# Patient Record
Sex: FEMALE | Race: WHITE | Employment: UNEMPLOYED | URBAN - METROPOLITAN AREA
[De-identification: names, ages, dates, MRNs, and addresses within clinical notes are randomized per-mention and may not be internally consistent; named-entity substitution may affect disease eponyms.]

---

## 2018-11-24 ENCOUNTER — HOSPITAL ENCOUNTER (INPATIENT)
Age: 33
LOS: 4 days | Discharge: HOME | DRG: 885 | End: 2018-11-28
Attending: EMERGENCY MEDICINE | Admitting: PSYCHIATRY & NEUROLOGY

## 2018-11-24 DIAGNOSIS — F41.9 ANXIETY: Primary | ICD-10-CM

## 2018-11-24 PROBLEM — F33.2 SEVERE EPISODE OF RECURRENT MAJOR DEPRESSIVE DISORDER, WITHOUT PSYCHOTIC FEATURES (HCC): Status: ACTIVE | Noted: 2018-11-24

## 2018-11-24 LAB
ACETAMINOPHEN LEVEL: <5 MCG/ML (ref 10–30)
ALBUMIN SERPL-MCNC: 4.4 G/DL (ref 3.5–5.2)
ALP BLD-CCNC: 76 U/L (ref 35–104)
ALT SERPL-CCNC: 6 U/L (ref 0–32)
AMPHETAMINE SCREEN, URINE: NOT DETECTED
ANION GAP SERPL CALCULATED.3IONS-SCNC: 14 MMOL/L (ref 7–16)
AST SERPL-CCNC: 7 U/L (ref 0–31)
BARBITURATE SCREEN URINE: NOT DETECTED
BENZODIAZEPINE SCREEN, URINE: NOT DETECTED
BILIRUB SERPL-MCNC: 0.2 MG/DL (ref 0–1.2)
BUN BLDV-MCNC: 16 MG/DL (ref 6–20)
CALCIUM SERPL-MCNC: 9.6 MG/DL (ref 8.6–10.2)
CANNABINOID SCREEN URINE: NOT DETECTED
CHLORIDE BLD-SCNC: 103 MMOL/L (ref 98–107)
CHP ED QC CHECK: YES
CO2: 25 MMOL/L (ref 22–29)
COCAINE METABOLITE SCREEN URINE: POSITIVE
CREAT SERPL-MCNC: 1 MG/DL (ref 0.5–1)
ETHANOL: <10 MG/DL (ref 0–0.08)
GFR AFRICAN AMERICAN: >60
GFR NON-AFRICAN AMERICAN: >60 ML/MIN/1.73
GLUCOSE BLD-MCNC: 91 MG/DL (ref 74–99)
HCT VFR BLD CALC: 47.5 % (ref 34–48)
HEMOGLOBIN: 15.3 G/DL (ref 11.5–15.5)
MCH RBC QN AUTO: 27.2 PG (ref 26–35)
MCHC RBC AUTO-ENTMCNC: 32.2 % (ref 32–34.5)
MCV RBC AUTO: 84.5 FL (ref 80–99.9)
METHADONE SCREEN, URINE: NOT DETECTED
OPIATE SCREEN URINE: NOT DETECTED
PDW BLD-RTO: 14.5 FL (ref 11.5–15)
PHENCYCLIDINE SCREEN URINE: NOT DETECTED
PLATELET # BLD: 273 E9/L (ref 130–450)
PMV BLD AUTO: 10.9 FL (ref 7–12)
POTASSIUM SERPL-SCNC: 3.9 MMOL/L (ref 3.5–5)
PREGNANCY TEST URINE, POC: NEGATIVE
PROPOXYPHENE SCREEN: NOT DETECTED
RBC # BLD: 5.62 E12/L (ref 3.5–5.5)
SALICYLATE, SERUM: <0.3 MG/DL (ref 0–30)
SODIUM BLD-SCNC: 142 MMOL/L (ref 132–146)
TOTAL PROTEIN: 7.9 G/DL (ref 6.4–8.3)
TRICYCLIC ANTIDEPRESSANTS SCREEN SERUM: NEGATIVE NG/ML
WBC # BLD: 10.7 E9/L (ref 4.5–11.5)

## 2018-11-24 PROCEDURE — 36415 COLL VENOUS BLD VENIPUNCTURE: CPT

## 2018-11-24 PROCEDURE — 99285 EMERGENCY DEPT VISIT HI MDM: CPT

## 2018-11-24 PROCEDURE — G0480 DRUG TEST DEF 1-7 CLASSES: HCPCS

## 2018-11-24 PROCEDURE — 80061 LIPID PANEL: CPT

## 2018-11-24 PROCEDURE — 80053 COMPREHEN METABOLIC PANEL: CPT

## 2018-11-24 PROCEDURE — 83036 HEMOGLOBIN GLYCOSYLATED A1C: CPT

## 2018-11-24 PROCEDURE — 1240000000 HC EMOTIONAL WELLNESS R&B

## 2018-11-24 PROCEDURE — 6370000000 HC RX 637 (ALT 250 FOR IP): Performed by: NURSE PRACTITIONER

## 2018-11-24 PROCEDURE — 6370000000 HC RX 637 (ALT 250 FOR IP): Performed by: EMERGENCY MEDICINE

## 2018-11-24 PROCEDURE — 85027 COMPLETE CBC AUTOMATED: CPT

## 2018-11-24 PROCEDURE — 80307 DRUG TEST PRSMV CHEM ANLYZR: CPT

## 2018-11-24 PROCEDURE — 99222 1ST HOSP IP/OBS MODERATE 55: CPT | Performed by: NURSE PRACTITIONER

## 2018-11-24 RX ORDER — MAGNESIUM HYDROXIDE/ALUMINUM HYDROXICE/SIMETHICONE 120; 1200; 1200 MG/30ML; MG/30ML; MG/30ML
30 SUSPENSION ORAL PRN
Status: DISCONTINUED | OUTPATIENT
Start: 2018-11-24 | End: 2018-11-28 | Stop reason: HOSPADM

## 2018-11-24 RX ORDER — RISPERIDONE 1 MG/1
1 TABLET, FILM COATED ORAL NIGHTLY
Status: DISCONTINUED | OUTPATIENT
Start: 2018-11-24 | End: 2018-11-28 | Stop reason: HOSPADM

## 2018-11-24 RX ORDER — BENZTROPINE MESYLATE 1 MG/ML
2 INJECTION INTRAMUSCULAR; INTRAVENOUS 2 TIMES DAILY PRN
Status: DISCONTINUED | OUTPATIENT
Start: 2018-11-24 | End: 2018-11-28 | Stop reason: HOSPADM

## 2018-11-24 RX ORDER — LORAZEPAM 1 MG/1
TABLET ORAL
Status: DISPENSED
Start: 2018-11-24 | End: 2018-11-24

## 2018-11-24 RX ORDER — ALPRAZOLAM 1 MG/1
2 TABLET ORAL ONCE
Status: COMPLETED | OUTPATIENT
Start: 2018-11-24 | End: 2018-11-24

## 2018-11-24 RX ORDER — TRAZODONE HYDROCHLORIDE 50 MG/1
50 TABLET ORAL NIGHTLY PRN
Status: DISCONTINUED | OUTPATIENT
Start: 2018-11-24 | End: 2018-11-28 | Stop reason: HOSPADM

## 2018-11-24 RX ORDER — HYDROXYZINE PAMOATE 50 MG/1
50 CAPSULE ORAL EVERY 6 HOURS PRN
Status: DISCONTINUED | OUTPATIENT
Start: 2018-11-24 | End: 2018-11-28 | Stop reason: HOSPADM

## 2018-11-24 RX ORDER — OLANZAPINE 10 MG/1
10 TABLET ORAL
Status: COMPLETED | OUTPATIENT
Start: 2018-11-24 | End: 2018-11-24

## 2018-11-24 RX ORDER — ACETAMINOPHEN 325 MG/1
650 TABLET ORAL EVERY 4 HOURS PRN
Status: DISCONTINUED | OUTPATIENT
Start: 2018-11-24 | End: 2018-11-28 | Stop reason: HOSPADM

## 2018-11-24 RX ORDER — LORAZEPAM 1 MG/1
2 TABLET ORAL ONCE
Status: COMPLETED | OUTPATIENT
Start: 2018-11-24 | End: 2018-11-24

## 2018-11-24 RX ORDER — HALOPERIDOL 5 MG/ML
10 INJECTION INTRAMUSCULAR EVERY 6 HOURS PRN
Status: DISCONTINUED | OUTPATIENT
Start: 2018-11-24 | End: 2018-11-28 | Stop reason: HOSPADM

## 2018-11-24 RX ADMIN — HYDROXYZINE PAMOATE 50 MG: 50 CAPSULE ORAL at 09:28

## 2018-11-24 RX ADMIN — ALPRAZOLAM 2 MG: 1 TABLET ORAL at 05:44

## 2018-11-24 RX ADMIN — OLANZAPINE 10 MG: 10 TABLET, FILM COATED ORAL at 11:07

## 2018-11-24 RX ADMIN — LORAZEPAM 2 MG: 1 TABLET ORAL at 03:01

## 2018-11-24 RX ADMIN — SERTRALINE 25 MG: 50 TABLET, FILM COATED ORAL at 11:07

## 2018-11-24 ASSESSMENT — SLEEP AND FATIGUE QUESTIONNAIRES
DO YOU HAVE DIFFICULTY SLEEPING: NO
AVERAGE NUMBER OF SLEEP HOURS: 3
DO YOU USE A SLEEP AID: NO
AVERAGE NUMBER OF SLEEP HOURS: 3
DO YOU USE A SLEEP AID: NO
DO YOU HAVE DIFFICULTY SLEEPING: NO

## 2018-11-24 ASSESSMENT — LIFESTYLE VARIABLES
HISTORY_ALCOHOL_USE: NO
HISTORY_ALCOHOL_USE: NO

## 2018-11-24 ASSESSMENT — PAIN SCALES - GENERAL: PAINLEVEL_OUTOF10: 0

## 2018-11-24 NOTE — H&P
PSYCHIATRIC EVALUATION  (HISTORY & PHYSICAL)     CHIEF COMPLAINT:   [x] Mood Problems [x] Anxiety Problems [] Psychosis                    [x] Suicidal/Homicidal   [] Aggression  [] Other    HISTORY OF PRESENT ILLNESS: Carol Maldonado  is a 28 y.o. female who has a previous psychiatric history of anxiety  and presents for admission with increased feelings of being overwhelmed, SI without plan, and HI towards no one in particular. Symptoms onset was 2 weeks ago when she states she stopped taking medications and states she has been having anxiety/panic attacks since. Patient is medication seeking and wanting to be injected with ativan, vistaril, and haldol. Patient states she uses heroin and fentanyl, UDS positive for cocaine. Symptoms are constant, severe, and worsened by medication substance abuse.       Precipitating Factors:     [] Family Stress   [] Recent loss/grief Stress   [] Health Stress   [] Relationship Stress    [] Legal Stress   [x] Environmental Stress    [] Occupational Stress   [] Financial Stress   [x] Substance Abuse [] Other      PAST PSYCHIATRIC HISTORY:   History of psychiatric Hospitalization:    [] Denies    [x] yes  [] Days ago     []  Weeks Ago    [x] Months ago  [] Years ago              [] [facility]  [x] [facility]  [] Other:        [x] Once  [] More than once    Outpatient treatment:  [] [facility] Jim[facility]  [] [facility]  [] [facility]              [] [facility]  [] [facility]      [] [address] [place] [address] [x] [facility]      [] [facility] [] [facility]  [] [facility] [] [facility]             [] currently  [x] in the past  [] Non-Compliant    [] Denies    Previous suicide attempt: []Denies            [x] yes  [] OD  [] Cutting  [x] Hanging  [] Gun  [] Other    Previous psych medications:  [x] Was prescribed               [] Currently Taking       [] Never taken medications      PAST MEDICAL HISTORY:       Diagnosis Date    Back pain        FAMILY PSYCHIATRIC HISTORY:  No family history on file.        [] Denies

## 2018-11-24 NOTE — ED NOTES
Attempted to call Dr Nohemy Alfaro to review case for admission, no answer      Yareli Nguyen, RN  11/24/18 1197

## 2018-11-24 NOTE — ED PROVIDER NOTES
after she stopped taking her medication, including xanax, because she could not afford them. She also confirms she is a heroine addict and notes her latest use was 2 days ago. Negative Suicidal ideation. Negative Homicidal ideation. No specific plan. Pt also denies A/V hallucinations, fever, chills, CP, SOB, abd pain, N/V/D. Pt complains of diffuse muscle cramps. Labs along with Labs will be ordered to determine the progression of treatment that will be provided to the patient. Labs and imaging obtained, reviewed; results discussed with patient. Consultations:   Social work     Re-Evaluations:        Counseling: The emergency provider has spoken with thepatient and discussed todays results, in addition to providing specific details for the plan of care and counseling regarding the diagnosis and prognosis. Questions are answered at this time and they are agreeable with the plan.     --------------------------------- IMPRESSION AND DISPOSITION ---------------------------------    IMPRESSION  1. Anxiety          Patient medically clear    DISPOSITION  Disposition: as per consultation   Patient condition is stable    11/24/18, 1:15 AM.    This note is prepared by Aldo Gonzalez, acting as Scribe for Lorri Giordano MD.    Lorri Giordano MD:  The scribe's documentation has been prepared under my direction and personally reviewed by me in its entirety. I confirm that the note above accurately reflects all work, treatment, procedures, and medical decision making performed by me.       Lorri Giordano MD  11/24/18 Ul. Joshua Verdin MD  11/24/18 0265

## 2018-11-25 LAB
CHOLESTEROL, TOTAL: 166 MG/DL (ref 0–199)
HBA1C MFR BLD: 5.4 % (ref 4–5.6)
HDLC SERPL-MCNC: 47 MG/DL
LDL CHOLESTEROL CALCULATED: 92 MG/DL (ref 0–99)
TRIGL SERPL-MCNC: 135 MG/DL (ref 0–149)
VLDLC SERPL CALC-MCNC: 27 MG/DL

## 2018-11-25 PROCEDURE — 99231 SBSQ HOSP IP/OBS SF/LOW 25: CPT | Performed by: NURSE PRACTITIONER

## 2018-11-25 PROCEDURE — 6370000000 HC RX 637 (ALT 250 FOR IP): Performed by: NURSE PRACTITIONER

## 2018-11-25 PROCEDURE — 1240000000 HC EMOTIONAL WELLNESS R&B

## 2018-11-25 RX ADMIN — HYDROXYZINE PAMOATE 50 MG: 50 CAPSULE ORAL at 08:58

## 2018-11-25 RX ADMIN — HYDROXYZINE PAMOATE 50 MG: 50 CAPSULE ORAL at 20:06

## 2018-11-25 RX ADMIN — HYDROXYZINE PAMOATE 50 MG: 50 CAPSULE ORAL at 15:10

## 2018-11-25 RX ADMIN — NICOTINE POLACRILEX 2 MG: 2 GUM, CHEWING BUCCAL at 20:36

## 2018-11-25 RX ADMIN — TRAZODONE HYDROCHLORIDE 50 MG: 50 TABLET ORAL at 20:06

## 2018-11-25 RX ADMIN — NICOTINE POLACRILEX 2 MG: 2 GUM, CHEWING BUCCAL at 15:53

## 2018-11-25 RX ADMIN — ACETAMINOPHEN 650 MG: 325 TABLET, FILM COATED ORAL at 16:48

## 2018-11-25 RX ADMIN — SERTRALINE 25 MG: 50 TABLET, FILM COATED ORAL at 08:39

## 2018-11-25 RX ADMIN — ACETAMINOPHEN 650 MG: 325 TABLET, FILM COATED ORAL at 08:58

## 2018-11-25 RX ADMIN — RISPERIDONE 1 MG: 1 TABLET, FILM COATED ORAL at 20:06

## 2018-11-25 ASSESSMENT — PAIN SCALES - GENERAL
PAINLEVEL_OUTOF10: 5
PAINLEVEL_OUTOF10: 0
PAINLEVEL_OUTOF10: 3
PAINLEVEL_OUTOF10: 0

## 2018-11-25 ASSESSMENT — PAIN DESCRIPTION - ONSET: ONSET: ON-GOING

## 2018-11-25 ASSESSMENT — PAIN DESCRIPTION - FREQUENCY: FREQUENCY: INTERMITTENT

## 2018-11-25 ASSESSMENT — PAIN DESCRIPTION - LOCATION: LOCATION: HEAD

## 2018-11-25 ASSESSMENT — PAIN DESCRIPTION - PROGRESSION: CLINICAL_PROGRESSION: NOT CHANGED

## 2018-11-25 ASSESSMENT — PAIN DESCRIPTION - PAIN TYPE: TYPE: ACUTE PAIN

## 2018-11-25 ASSESSMENT — PAIN DESCRIPTION - DESCRIPTORS: DESCRIPTORS: ACHING;HEADACHE

## 2018-11-26 PROCEDURE — 1240000000 HC EMOTIONAL WELLNESS R&B

## 2018-11-26 PROCEDURE — 6370000000 HC RX 637 (ALT 250 FOR IP): Performed by: NURSE PRACTITIONER

## 2018-11-26 RX ADMIN — SERTRALINE 50 MG: 50 TABLET, FILM COATED ORAL at 08:55

## 2018-11-26 RX ADMIN — HYDROXYZINE PAMOATE 50 MG: 50 CAPSULE ORAL at 12:32

## 2018-11-26 RX ADMIN — RISPERIDONE 1 MG: 1 TABLET, FILM COATED ORAL at 20:18

## 2018-11-26 RX ADMIN — TRAZODONE HYDROCHLORIDE 50 MG: 50 TABLET ORAL at 20:18

## 2018-11-26 RX ADMIN — HYDROXYZINE PAMOATE 50 MG: 50 CAPSULE ORAL at 18:38

## 2018-11-26 RX ADMIN — NICOTINE POLACRILEX 2 MG: 2 GUM, CHEWING BUCCAL at 10:58

## 2018-11-26 ASSESSMENT — PAIN SCALES - GENERAL
PAINLEVEL_OUTOF10: 0
PAINLEVEL_OUTOF10: 0

## 2018-11-26 NOTE — PLAN OF CARE
Problem: Altered Mood, Depressive Behavior:  Goal: Able to verbalize and/or display a decrease in depressive symptoms  Able to verbalize and/or display a decrease in depressive symptoms   Outcome: Ongoing    Goal: Absence of self-harm  Absence of self-harm   Outcome: Ongoing  Pt is stable and without distress. Pt is cooperative. Pt denies suicidal / homicidal ideations. Pt denies hallucinations. Will follow and monitor.

## 2018-11-27 PROCEDURE — 1240000000 HC EMOTIONAL WELLNESS R&B

## 2018-11-27 PROCEDURE — 6360000002 HC RX W HCPCS: Performed by: NURSE PRACTITIONER

## 2018-11-27 PROCEDURE — 6370000000 HC RX 637 (ALT 250 FOR IP): Performed by: NURSE PRACTITIONER

## 2018-11-27 RX ADMIN — HYDROXYZINE PAMOATE 50 MG: 50 CAPSULE ORAL at 08:54

## 2018-11-27 RX ADMIN — RISPERIDONE 1 MG: 1 TABLET, FILM COATED ORAL at 20:28

## 2018-11-27 RX ADMIN — SERTRALINE 50 MG: 50 TABLET, FILM COATED ORAL at 08:54

## 2018-11-27 RX ADMIN — HYDROXYZINE PAMOATE 50 MG: 50 CAPSULE ORAL at 20:28

## 2018-11-27 RX ADMIN — HALOPERIDOL LACTATE 10 MG: 5 INJECTION, SOLUTION INTRAMUSCULAR at 13:32

## 2018-11-27 RX ADMIN — TRAZODONE HYDROCHLORIDE 50 MG: 50 TABLET ORAL at 20:28

## 2018-11-27 RX ADMIN — BENZTROPINE MESYLATE 2 MG: 1 INJECTION, SOLUTION INTRAMUSCULAR; INTRAVENOUS at 13:32

## 2018-11-27 ASSESSMENT — PAIN SCALES - GENERAL
PAINLEVEL_OUTOF10: 0
PAINLEVEL_OUTOF10: 0

## 2018-11-28 VITALS
OXYGEN SATURATION: 98 % | SYSTOLIC BLOOD PRESSURE: 101 MMHG | RESPIRATION RATE: 16 BRPM | WEIGHT: 170 LBS | HEIGHT: 69 IN | HEART RATE: 76 BPM | TEMPERATURE: 98 F | BODY MASS INDEX: 25.18 KG/M2 | DIASTOLIC BLOOD PRESSURE: 64 MMHG

## 2018-11-28 LAB — COCAINE, CONFIRM, URINE: >1000 NG/ML

## 2018-11-28 PROCEDURE — 6370000000 HC RX 637 (ALT 250 FOR IP): Performed by: NURSE PRACTITIONER

## 2018-11-28 PROCEDURE — 99238 HOSP IP/OBS DSCHRG MGMT 30/<: CPT | Performed by: PSYCHIATRY & NEUROLOGY

## 2018-11-28 RX ORDER — RISPERIDONE 1 MG/1
1 TABLET, FILM COATED ORAL NIGHTLY
Qty: 60 TABLET | Refills: 0 | Status: SHIPPED | OUTPATIENT
Start: 2018-11-28 | End: 2021-08-11

## 2018-11-28 RX ADMIN — SERTRALINE 50 MG: 50 TABLET, FILM COATED ORAL at 08:47

## 2018-11-28 RX ADMIN — HYDROXYZINE PAMOATE 50 MG: 50 CAPSULE ORAL at 08:47

## 2018-11-28 NOTE — PROGRESS NOTES
CLINICAL PHARMACY NOTE: MEDS TO [address] [place] [address] Drive Select Patient?: No  Total # of Prescriptions Filled: 3   The following medications were delivered to the patient:  · Nicotine 2 Gum  · Sertraline 50  · Risperdone 1  Total # of Interventions Completed: 3  Time Spent (min): 30    Additional Documentation:
Demanding medication, stating \"Im going to fucking flip out in this place\". Another pt reported to Alexandr Scott RN that this pt was asking her how to get ativan prescribed. Staff is aware of pt's med seeking behaviors.
Making threats to \" fuck somebody up\". Encouraged this patient to express her feelings. She stated \" They were giving me xanax and ativan, and then I come here and im not getting anything\". Explained to pt that those medications are not ordered at this time. This nurse explained the medications that are ordered for her to help with her anxiety, and she stated \"none of that shit helps\". Pt threatened to \"flip out\", and this nurse explained that we have a quiet room for time out if she were to need it. Pt agreed to try zyprexa 10mg PRN once, and was compliant with zoloft. Will continue to monitor and offer support.
Patient attended community meeting. Shared daily goal of \" Stay positive\". Group Therapy Note    Date: 11/26/2018  Start Time: 10:00 am  End Time:  10:50 am  Number of Participants: 20    Type of Group: Psychoeducation    Wellness Binder Information  Module Name:  Looking Back Looking Forward  Session Number:  N/A    Patient's Goal:  Patient will reflect accomplishments and challenges that impact wellness recovery. Notes:  Positively participated in discussion. Able to share with peers examples looking back and forward. Status After Intervention:  Improved    Participation Level:  Active Listener and Interactive    Participation Quality: Appropriate, Attentive and Sharing      Speech:  normal      Thought Process/Content: Logical      Affective Functioning: Congruent      Mood: euthymic      Level of consciousness:  Alert and Attentive      Response to Learning: Progressing to goal      Endings: None Reported    Modes of Intervention: Education, Support, Socialization and Exploration      Discipline Responsible: Psychoeducational Specialist      Signature:  Chase Joseph, [address]
Patient attended community meeting. Shared daily goal of \" Stay positive\". Group Therapy Note    Date: 11/27/2018  Start Time: 10:00 am  End Time:  10:45 am  Number of Participants: 20     Type of Group: Psychoeducation     Wellness Binder Information  Module Name:  Goals & Change  Session Number:  N/A     Patient's Goal:  Patient will recognize importance of goals and change in wellness recovery.     Notes:  Positively participated in discussion. Able to identify meaningful goals to promote change. Status After Intervention:  Improved    Participation Level:  Active Listener and Interactive    Participation Quality: Appropriate and Attentive      Speech:  normal      Thought Process/Content: Logical      Affective Functioning: Congruent      Mood: euthymic      Level of consciousness:  Alert and Attentive      Response to Learning: Progressing to goal      Endings: None Reported    Modes of Intervention: Education, Support, Socialization and Exploration      Discipline Responsible: Psychoeducational Specialist      Signature:  Lindsay Decker, [address]
Pt. Angry irritable referring to me as \"fucking bitch\", showing off for other pt.s, haldol requested and given per order.
Judgment: Poor Judgment, Poor Insight  Present Suicidal Ideation: No  Present Homicidal Ideation: No    Daily Assessment Last Entry:   Daily Sleep (WDL): Within Defined Limits         Patient Currently in Pain: Denies  Daily Nutrition (WDL): Within Defined Limits    Patient Monitoring:  Frequency of Checks: 4 times per hour, close    Psychiatric Symptoms:   Depression Symptoms  Depression Symptoms: No problems reported or observed. Anxiety Symptoms  Anxiety Symptoms: No problems reported or observed., Generalized  Janiya Symptoms  Janiya Symptoms: No problems reported or observed. Psychosis Symptoms  Hallucination Type: No problems reported or observed. Delusion Type: No problems reported or observed. Suicide Risk CSSR-S:  1) Within the past month, have you wished you were dead or wished you could go to sleep and not wake up? : NO  2) Within the past month, have you actually had any thoughts of killing yourself? : YES  3) Within the past month, have you been thinking about how you might kill yourself? : NO  5) Within the past month, have you started to work out or worked out the details of how to kill yourself?  Do you intend to carry out this plan? : NO  6)  Have you ever done anything, started to do anything, or prepared to do anything to end your life?: NO  Change in Result no Change in Plan of care none      EDUCATION:   Learner Progress Toward Treatment Goals: progressing    Method: follow care plan    Outcome: meet goals AND RETURN HOME    PATIENT GOALS: \"STAY POSITIVE\"    PLAN/TREATMENT RECOMMENDATIONS UPDATE: CONTINUE PRESENT CARE PLAN    GOALS UPDATE:   Time frame for Short-Term Goals: 3-5 days      Patrice Esparza RN

## 2018-11-28 NOTE — PLAN OF CARE
Problem: Altered Mood, Depressive Behavior:  Goal: Able to verbalize acceptance of life and situations over which he or she has no control  Able to verbalize acceptance of life and situations over which he or she has no control   Outcome: Ongoing    Goal: Able to verbalize support systems  Able to verbalize support systems   Outcome: Ongoing  Pt is stable. Pt denies suicidal or homicidal ideations. Pt denies hallucinations. Pt cooperative. Reports goal of having a positive outlook. Will follow and monitor.

## 2019-10-31 ENCOUNTER — HOSPITAL ENCOUNTER (EMERGENCY)
Age: 34
Discharge: HOME | End: 2019-10-31

## 2019-10-31 VITALS
DIASTOLIC BLOOD PRESSURE: 80 MMHG | OXYGEN SATURATION: 98 % | HEART RATE: 75 BPM | BODY MASS INDEX: 23.86 KG/M2 | TEMPERATURE: 98 F | HEIGHT: 69 IN | SYSTOLIC BLOOD PRESSURE: 127 MMHG | WEIGHT: 161.06 LBS | RESPIRATION RATE: 17 BRPM

## 2019-10-31 DIAGNOSIS — Z76.0 ENCOUNTER FOR MEDICATION REFILL: Primary | ICD-10-CM

## 2019-10-31 DIAGNOSIS — K08.89 PAIN, DENTAL: ICD-10-CM

## 2019-10-31 PROCEDURE — 99282 EMERGENCY DEPT VISIT SF MDM: CPT

## 2019-10-31 PROCEDURE — 6370000000 HC RX 637 (ALT 250 FOR IP): Performed by: NURSE PRACTITIONER

## 2019-10-31 PROCEDURE — 6360000002 HC RX W HCPCS: Performed by: NURSE PRACTITIONER

## 2019-10-31 PROCEDURE — 96372 THER/PROPH/DIAG INJ SC/IM: CPT

## 2019-10-31 RX ORDER — CHLORHEXIDINE GLUCONATE 0.12 MG/ML
15 RINSE ORAL 2 TIMES DAILY
Qty: 420 ML | Refills: 0 | Status: SHIPPED | OUTPATIENT
Start: 2019-10-31 | End: 2019-11-14

## 2019-10-31 RX ORDER — AMOXICILLIN 500 MG/1
500 CAPSULE ORAL 2 TIMES DAILY
Qty: 14 CAPSULE | Refills: 0 | Status: SHIPPED | OUTPATIENT
Start: 2019-10-31 | End: 2019-11-07

## 2019-10-31 RX ORDER — AMOXICILLIN 250 MG/1
500 CAPSULE ORAL ONCE
Status: COMPLETED | OUTPATIENT
Start: 2019-10-31 | End: 2019-10-31

## 2019-10-31 RX ORDER — IBUPROFEN 800 MG/1
800 TABLET ORAL EVERY 8 HOURS PRN
Qty: 21 TABLET | Refills: 0 | Status: SHIPPED | OUTPATIENT
Start: 2019-10-31 | End: 2021-08-31 | Stop reason: ALTCHOICE

## 2019-10-31 RX ORDER — HYDROXYZINE HYDROCHLORIDE 50 MG/ML
50 INJECTION, SOLUTION INTRAMUSCULAR ONCE
Status: COMPLETED | OUTPATIENT
Start: 2019-10-31 | End: 2019-10-31

## 2019-10-31 RX ORDER — IBUPROFEN 800 MG/1
800 TABLET ORAL ONCE
Status: COMPLETED | OUTPATIENT
Start: 2019-10-31 | End: 2019-10-31

## 2019-10-31 RX ADMIN — HYDROXYZINE HYDROCHLORIDE 50 MG: 50 INJECTION, SOLUTION INTRAMUSCULAR at 01:14

## 2019-10-31 RX ADMIN — IBUPROFEN 800 MG: 800 TABLET, FILM COATED ORAL at 01:13

## 2019-10-31 RX ADMIN — AMOXICILLIN 500 MG: 250 CAPSULE ORAL at 01:14

## 2019-10-31 ASSESSMENT — PAIN SCALES - GENERAL: PAINLEVEL_OUTOF10: 7

## 2019-11-02 ENCOUNTER — HOSPITAL ENCOUNTER (EMERGENCY)
Age: 34
Discharge: LEFT BEFORE DISCHARGE | End: 2019-11-02
Attending: EMERGENCY MEDICINE

## 2019-11-02 VITALS
WEIGHT: 161 LBS | HEIGHT: 68 IN | DIASTOLIC BLOOD PRESSURE: 87 MMHG | SYSTOLIC BLOOD PRESSURE: 102 MMHG | HEART RATE: 75 BPM | TEMPERATURE: 97.3 F | OXYGEN SATURATION: 98 % | BODY MASS INDEX: 24.4 KG/M2 | RESPIRATION RATE: 16 BRPM

## 2019-11-02 DIAGNOSIS — L03.90 CELLULITIS, UNSPECIFIED CELLULITIS SITE: ICD-10-CM

## 2019-11-02 DIAGNOSIS — Z53.21 ELOPED FROM EMERGENCY DEPARTMENT: Primary | ICD-10-CM

## 2019-11-02 PROCEDURE — 2500000003 HC RX 250 WO HCPCS: Performed by: NURSE PRACTITIONER

## 2019-11-02 PROCEDURE — 6370000000 HC RX 637 (ALT 250 FOR IP): Performed by: NURSE PRACTITIONER

## 2019-11-02 PROCEDURE — 6370000000 HC RX 637 (ALT 250 FOR IP): Performed by: EMERGENCY MEDICINE

## 2019-11-02 PROCEDURE — 99282 EMERGENCY DEPT VISIT SF MDM: CPT

## 2019-11-02 RX ORDER — LIDOCAINE HYDROCHLORIDE 10 MG/ML
5 INJECTION, SOLUTION INFILTRATION; PERINEURAL ONCE
Status: COMPLETED | OUTPATIENT
Start: 2019-11-02 | End: 2019-11-02

## 2019-11-02 RX ORDER — IBUPROFEN 800 MG/1
800 TABLET ORAL ONCE
Status: COMPLETED | OUTPATIENT
Start: 2019-11-02 | End: 2019-11-02

## 2019-11-02 RX ORDER — DOXYCYCLINE HYCLATE 100 MG/1
100 CAPSULE ORAL ONCE
Status: COMPLETED | OUTPATIENT
Start: 2019-11-02 | End: 2019-11-02

## 2019-11-02 RX ADMIN — IBUPROFEN 800 MG: 800 TABLET, FILM COATED ORAL at 02:57

## 2019-11-02 RX ADMIN — DOXYCYCLINE HYCLATE 100 MG: 100 CAPSULE ORAL at 02:58

## 2019-11-02 RX ADMIN — LIDOCAINE HYDROCHLORIDE 5 ML: 10 INJECTION, SOLUTION INFILTRATION; PERINEURAL at 02:58

## 2019-11-02 ASSESSMENT — PAIN DESCRIPTION - PAIN TYPE: TYPE: ACUTE PAIN

## 2019-11-02 ASSESSMENT — PAIN DESCRIPTION - FREQUENCY: FREQUENCY: CONTINUOUS

## 2019-11-02 ASSESSMENT — PAIN SCALES - GENERAL
PAINLEVEL_OUTOF10: 5
PAINLEVEL_OUTOF10: 10

## 2019-11-02 ASSESSMENT — PAIN DESCRIPTION - ORIENTATION: ORIENTATION: RIGHT;LEFT

## 2019-11-02 ASSESSMENT — PAIN DESCRIPTION - DESCRIPTORS: DESCRIPTORS: BURNING;STABBING

## 2019-11-02 ASSESSMENT — PAIN DESCRIPTION - LOCATION: LOCATION: HAND;WRIST

## 2020-02-06 ENCOUNTER — HOSPITAL ENCOUNTER (EMERGENCY)
Age: 35
Discharge: LEFT BEFORE DISCHARGE | End: 2020-02-06
Attending: EMERGENCY MEDICINE
Payer: MEDICAID

## 2020-02-06 VITALS
OXYGEN SATURATION: 97 % | WEIGHT: 162 LBS | TEMPERATURE: 98.5 F | DIASTOLIC BLOOD PRESSURE: 99 MMHG | SYSTOLIC BLOOD PRESSURE: 146 MMHG | RESPIRATION RATE: 18 BRPM | HEART RATE: 85 BPM | BODY MASS INDEX: 24.55 KG/M2 | HEIGHT: 68 IN

## 2020-02-06 LAB
BACTERIA: ABNORMAL /HPF
BILIRUBIN URINE: NEGATIVE
BLOOD, URINE: NEGATIVE
CLARITY: CLEAR
COLOR: YELLOW
EPITHELIAL CELLS, UA: ABNORMAL /HPF
GLUCOSE URINE: NEGATIVE MG/DL
HCG, URINE, POC: NEGATIVE
KETONES, URINE: NEGATIVE MG/DL
LEUKOCYTE ESTERASE, URINE: NEGATIVE
Lab: NORMAL
NEGATIVE QC PASS/FAIL: NORMAL
NITRITE, URINE: POSITIVE
PH UA: 7 (ref 5–9)
POSITIVE QC PASS/FAIL: NORMAL
PROTEIN UA: NEGATIVE MG/DL
RBC UA: ABNORMAL /HPF (ref 0–2)
SPECIFIC GRAVITY UA: 1.02 (ref 1–1.03)
UROBILINOGEN, URINE: 0.2 E.U./DL
WBC UA: ABNORMAL /HPF (ref 0–5)

## 2020-02-06 PROCEDURE — 99283 EMERGENCY DEPT VISIT LOW MDM: CPT

## 2020-02-06 PROCEDURE — 6370000000 HC RX 637 (ALT 250 FOR IP): Performed by: EMERGENCY MEDICINE

## 2020-02-06 PROCEDURE — 81001 URINALYSIS AUTO W/SCOPE: CPT

## 2020-02-06 RX ORDER — CLONIDINE HYDROCHLORIDE 0.1 MG/1
0.1 TABLET ORAL ONCE
Status: COMPLETED | OUTPATIENT
Start: 2020-02-06 | End: 2020-02-06

## 2020-02-06 RX ORDER — IBUPROFEN 600 MG/1
600 TABLET ORAL ONCE
Status: COMPLETED | OUTPATIENT
Start: 2020-02-06 | End: 2020-02-06

## 2020-02-06 RX ORDER — CEFDINIR 300 MG/1
300 CAPSULE ORAL 2 TIMES DAILY
Qty: 20 CAPSULE | Refills: 0 | Status: SHIPPED | OUTPATIENT
Start: 2020-02-06 | End: 2020-02-16

## 2020-02-06 RX ADMIN — CLONIDINE HYDROCHLORIDE 0.1 MG: 0.1 TABLET ORAL at 21:14

## 2020-02-06 RX ADMIN — IBUPROFEN 600 MG: 600 TABLET, FILM COATED ORAL at 21:42

## 2020-02-06 ASSESSMENT — ENCOUNTER SYMPTOMS
DIARRHEA: 0
NAUSEA: 0
RHINORRHEA: 1
BACK PAIN: 0
EYE DISCHARGE: 0
SHORTNESS OF BREATH: 0
COUGH: 0
SINUS PRESSURE: 0
EYE PAIN: 0
ABDOMINAL PAIN: 1
WHEEZING: 0
ABDOMINAL DISTENTION: 0
VOMITING: 0
SORE THROAT: 0
EYE REDNESS: 0

## 2020-02-06 ASSESSMENT — PAIN DESCRIPTION - PAIN TYPE: TYPE: CHRONIC PAIN

## 2020-02-06 ASSESSMENT — PAIN DESCRIPTION - ORIENTATION: ORIENTATION: LEFT

## 2020-02-06 ASSESSMENT — PAIN DESCRIPTION - ONSET: ONSET: ON-GOING

## 2020-02-06 ASSESSMENT — PAIN DESCRIPTION - DESCRIPTORS: DESCRIPTORS: BURNING

## 2020-02-06 ASSESSMENT — PAIN SCALES - GENERAL
PAINLEVEL_OUTOF10: 10
PAINLEVEL_OUTOF10: 10

## 2020-02-06 ASSESSMENT — PAIN DESCRIPTION - LOCATION: LOCATION: HAND

## 2020-02-06 ASSESSMENT — PAIN DESCRIPTION - PROGRESSION: CLINICAL_PROGRESSION: NOT CHANGED

## 2020-02-06 ASSESSMENT — PAIN DESCRIPTION - FREQUENCY: FREQUENCY: CONTINUOUS

## 2020-02-07 NOTE — ED PROVIDER NOTES
Patient is a 25-year-old female past medical history of anxiety, depression, IV drug abuse presenting to the emergency department today for dope sickness. Patient was arrested this evening by police and stated that she \"did not want to die in residential\". Her last use of heroin was this morning. She does use intravenously. She states she has been feeling the withdrawal symptoms for the last couple hours. She is having congestion, runny nose, abdominal cramping. She also is complaining of urinary complaints. She states dysuria and increased frequency over the last 3 to 4 days. She states she is also having some mild right flank pain yesterday but that has improved. Denies any nausea, vomiting, chest pain, shortness of breath, fevers, chills. She also is complaining of abscesses that she states she has had for a \"very long time\". Has been able to tolerate food and liquids p.o. today without complaint. Has not tried anything at home for the pain or sickness. Nothing makes it better nothing makes it worse. Pain does not radiate. Denies urinary bowel incontinence, no saddle paresthesias. Review of Systems   Constitutional: Negative for chills and fever. HENT: Positive for congestion and rhinorrhea. Negative for ear pain, sinus pressure and sore throat. Eyes: Negative for pain, discharge and redness. Respiratory: Negative for cough, shortness of breath and wheezing. Cardiovascular: Negative for chest pain and leg swelling. Gastrointestinal: Positive for abdominal pain (cramping). Negative for abdominal distention, diarrhea, nausea and vomiting. Genitourinary: Positive for dysuria, flank pain (yesterday, improving) and frequency. Negative for decreased urine volume and hematuria. Musculoskeletal: Negative for arthralgias, back pain, gait problem, neck pain and neck stiffness. Skin: Negative for pallor and rash. Neurological: Negative for dizziness, seizures, weakness and headaches.

## 2021-02-06 ENCOUNTER — HOSPITAL ENCOUNTER (EMERGENCY)
Age: 36
Discharge: LEFT BEFORE DISCHARGE | End: 2021-02-07
Attending: EMERGENCY MEDICINE
Payer: MEDICARE

## 2021-02-06 DIAGNOSIS — T40.601A OPIATE OVERDOSE, ACCIDENTAL OR UNINTENTIONAL, INITIAL ENCOUNTER (HCC): Primary | ICD-10-CM

## 2021-02-06 DIAGNOSIS — R41.82 ALTERED MENTAL STATUS, UNSPECIFIED ALTERED MENTAL STATUS TYPE: ICD-10-CM

## 2021-02-06 LAB
CHP ED QC CHECK: YES
GLUCOSE BLD-MCNC: 72 MG/DL
METER GLUCOSE: 102 MG/DL (ref 74–99)
METER GLUCOSE: 72 MG/DL (ref 74–99)

## 2021-02-06 PROCEDURE — 96374 THER/PROPH/DIAG INJ IV PUSH: CPT

## 2021-02-06 PROCEDURE — 6360000002 HC RX W HCPCS: Performed by: STUDENT IN AN ORGANIZED HEALTH CARE EDUCATION/TRAINING PROGRAM

## 2021-02-06 PROCEDURE — 99285 EMERGENCY DEPT VISIT HI MDM: CPT

## 2021-02-06 PROCEDURE — 82962 GLUCOSE BLOOD TEST: CPT

## 2021-02-06 RX ORDER — NALOXONE HYDROCHLORIDE 1 MG/ML
1 INJECTION INTRAMUSCULAR; INTRAVENOUS; SUBCUTANEOUS ONCE
Status: COMPLETED | OUTPATIENT
Start: 2021-02-06 | End: 2021-02-06

## 2021-02-06 RX ADMIN — NALOXONE HYDROCHLORIDE 1 MG: 1 INJECTION PARENTERAL at 23:32

## 2021-02-07 ENCOUNTER — APPOINTMENT (OUTPATIENT)
Dept: GENERAL RADIOLOGY | Age: 36
End: 2021-02-07
Payer: MEDICARE

## 2021-02-07 VITALS
DIASTOLIC BLOOD PRESSURE: 94 MMHG | TEMPERATURE: 97.7 F | OXYGEN SATURATION: 93 % | SYSTOLIC BLOOD PRESSURE: 120 MMHG | RESPIRATION RATE: 10 BRPM | HEART RATE: 97 BPM

## 2021-02-07 LAB
ACETAMINOPHEN LEVEL: <5 MCG/ML (ref 10–30)
ALBUMIN SERPL-MCNC: 4 G/DL (ref 3.5–5.2)
ALP BLD-CCNC: 84 U/L (ref 35–104)
ALT SERPL-CCNC: 66 U/L (ref 0–32)
ANION GAP SERPL CALCULATED.3IONS-SCNC: 9 MMOL/L (ref 7–16)
AST SERPL-CCNC: 44 U/L (ref 0–31)
B.E.: 1.5 MMOL/L (ref -3–0)
BASOPHILS ABSOLUTE: 0.04 E9/L (ref 0–0.2)
BASOPHILS RELATIVE PERCENT: 0.4 % (ref 0–2)
BILIRUB SERPL-MCNC: 0.4 MG/DL (ref 0–1.2)
BUN BLDV-MCNC: 10 MG/DL (ref 6–20)
CALCIUM SERPL-MCNC: 8.7 MG/DL (ref 8.6–10.2)
CARDIOPULMONARY BYPASS: NO
CHLORIDE BLD-SCNC: 103 MMOL/L (ref 98–107)
CO2: 26 MMOL/L (ref 22–29)
CREAT SERPL-MCNC: 0.8 MG/DL (ref 0.5–1)
DELIVERY SYSTEMS: ABNORMAL
DEVICE: ABNORMAL
EKG ATRIAL RATE: 95 BPM
EKG P AXIS: 68 DEGREES
EKG P-R INTERVAL: 120 MS
EKG Q-T INTERVAL: 352 MS
EKG QRS DURATION: 82 MS
EKG QTC CALCULATION (BAZETT): 442 MS
EKG R AXIS: 36 DEGREES
EKG T AXIS: 25 DEGREES
EKG VENTRICULAR RATE: 95 BPM
EOSINOPHILS ABSOLUTE: 0.14 E9/L (ref 0.05–0.5)
EOSINOPHILS RELATIVE PERCENT: 1.5 % (ref 0–6)
ETHANOL: <10 MG/DL (ref 0–0.08)
GFR AFRICAN AMERICAN: >60
GFR NON-AFRICAN AMERICAN: >60 ML/MIN/1.73
GLUCOSE BLD-MCNC: 99 MG/DL (ref 74–99)
HCO3 ARTERIAL: 28.7 MMOL/L (ref 22–26)
HCT (EST): 43 % (ref 34–48)
HCT VFR BLD CALC: 43.3 % (ref 34–48)
HEMOGLOBIN: 13.6 G/DL (ref 11.5–15.5)
HGB, (EST): 14.7 G/DL (ref 11.5–15.5)
IMMATURE GRANULOCYTES #: 0.06 E9/L
IMMATURE GRANULOCYTES %: 0.7 % (ref 0–5)
LYMPHOCYTES ABSOLUTE: 2.43 E9/L (ref 1.5–4)
LYMPHOCYTES RELATIVE PERCENT: 26.7 % (ref 20–42)
MCH RBC QN AUTO: 27.6 PG (ref 26–35)
MCHC RBC AUTO-ENTMCNC: 31.4 % (ref 32–34.5)
MCV RBC AUTO: 88 FL (ref 80–99.9)
MONOCYTES ABSOLUTE: 0.88 E9/L (ref 0.1–0.95)
MONOCYTES RELATIVE PERCENT: 9.7 % (ref 2–12)
NEUTROPHILS ABSOLUTE: 5.55 E9/L (ref 1.8–7.3)
NEUTROPHILS RELATIVE PERCENT: 61 % (ref 43–80)
O2 SATURATION: 95.7 % (ref 92–98.5)
OPERATOR ID: 1064
PCO2 ARTERIAL: 54.2 MMHG (ref 35–45)
PDW BLD-RTO: 14.7 FL (ref 11.5–15)
PH BLOOD GAS: 7.33 (ref 7.35–7.45)
PLATELET # BLD: 233 E9/L (ref 130–450)
PMV BLD AUTO: 10.9 FL (ref 7–12)
PO2 ARTERIAL: 87 MMHG (ref 80–100)
POTASSIUM SERPL-SCNC: 4.1 MMOL/L (ref 3.5–5.5)
POTASSIUM SERPL-SCNC: 4.2 MMOL/L (ref 3.5–5)
PRO-BNP: 145 PG/ML (ref 0–125)
RBC # BLD: 4.92 E12/L (ref 3.5–5.5)
SALICYLATE, SERUM: <0.3 MG/DL (ref 0–30)
SARS-COV-2, NAAT: NOT DETECTED
SODIUM BLD-SCNC: 138 MMOL/L (ref 132–146)
SOURCE, BLOOD GAS: ABNORMAL
TOTAL CK: 54 U/L (ref 20–180)
TOTAL PROTEIN: 7.3 G/DL (ref 6.4–8.3)
TRICYCLIC ANTIDEPRESSANTS SCREEN SERUM: NEGATIVE NG/ML
WBC # BLD: 9.1 E9/L (ref 4.5–11.5)

## 2021-02-07 PROCEDURE — 80143 DRUG ASSAY ACETAMINOPHEN: CPT

## 2021-02-07 PROCEDURE — 82550 ASSAY OF CK (CPK): CPT

## 2021-02-07 PROCEDURE — 82803 BLOOD GASES ANY COMBINATION: CPT

## 2021-02-07 PROCEDURE — 80179 DRUG ASSAY SALICYLATE: CPT

## 2021-02-07 PROCEDURE — 6360000002 HC RX W HCPCS: Performed by: EMERGENCY MEDICINE

## 2021-02-07 PROCEDURE — U0002 COVID-19 LAB TEST NON-CDC: HCPCS

## 2021-02-07 PROCEDURE — 93010 ELECTROCARDIOGRAM REPORT: CPT | Performed by: INTERNAL MEDICINE

## 2021-02-07 PROCEDURE — 83880 ASSAY OF NATRIURETIC PEPTIDE: CPT

## 2021-02-07 PROCEDURE — 82077 ASSAY SPEC XCP UR&BREATH IA: CPT

## 2021-02-07 PROCEDURE — 85025 COMPLETE CBC W/AUTO DIFF WBC: CPT

## 2021-02-07 PROCEDURE — 71045 X-RAY EXAM CHEST 1 VIEW: CPT

## 2021-02-07 PROCEDURE — 93005 ELECTROCARDIOGRAM TRACING: CPT | Performed by: EMERGENCY MEDICINE

## 2021-02-07 PROCEDURE — 80053 COMPREHEN METABOLIC PANEL: CPT

## 2021-02-07 PROCEDURE — 80307 DRUG TEST PRSMV CHEM ANLYZR: CPT

## 2021-02-07 RX ORDER — MECLIZINE HCL 12.5 MG/1
12.5 TABLET ORAL ONCE
Status: DISCONTINUED | OUTPATIENT
Start: 2021-02-07 | End: 2021-02-07

## 2021-02-07 RX ORDER — NALOXONE HYDROCHLORIDE 0.4 MG/ML
2 INJECTION, SOLUTION INTRAMUSCULAR; INTRAVENOUS; SUBCUTANEOUS ONCE
Status: DISCONTINUED | OUTPATIENT
Start: 2021-02-07 | End: 2021-02-07

## 2021-02-07 RX ORDER — NALOXONE HYDROCHLORIDE 1 MG/ML
1 INJECTION INTRAMUSCULAR; INTRAVENOUS; SUBCUTANEOUS ONCE
Status: DISCONTINUED | OUTPATIENT
Start: 2021-02-07 | End: 2021-02-07

## 2021-02-07 RX ORDER — NALOXONE HYDROCHLORIDE 1 MG/ML
4 INJECTION INTRAMUSCULAR; INTRAVENOUS; SUBCUTANEOUS ONCE
Status: COMPLETED | OUTPATIENT
Start: 2021-02-07 | End: 2021-02-07

## 2021-02-07 RX ORDER — NALOXONE HYDROCHLORIDE 0.4 MG/ML
0.4 INJECTION, SOLUTION INTRAMUSCULAR; INTRAVENOUS; SUBCUTANEOUS ONCE
Status: COMPLETED | OUTPATIENT
Start: 2021-02-07 | End: 2021-02-07

## 2021-02-07 RX ADMIN — NALOXONE HYDROCHLORIDE 0.4 MG: 0.4 INJECTION, SOLUTION INTRAMUSCULAR; INTRAVENOUS; SUBCUTANEOUS at 07:14

## 2021-02-07 RX ADMIN — NALOXONE HYDROCHLORIDE 4 MG: 1 INJECTION INTRAMUSCULAR; INTRAVENOUS; SUBCUTANEOUS at 07:47

## 2021-02-07 ASSESSMENT — ENCOUNTER SYMPTOMS
ABDOMINAL PAIN: 0
SHORTNESS OF BREATH: 0
NAUSEA: 0
VOMITING: 0
ABDOMINAL DISTENTION: 0
PHOTOPHOBIA: 0
INGESTION: 1
DIARRHEA: 0

## 2021-02-07 NOTE — ED NOTES
Per Dr Shane Linda pt has had multiple opiate overdoses with multiple Narcan administrations within past 18 hours. Peer Support attempted to assess, pt either could not or would not rouse to voice, answer questions or interact. Discussed with Dr Shane Linda, reports will monitor, SW will attempt to assess in 60-90 minutes.        [address] [place], [place] [address]rogelio, AD[place], [place]  02/07/21 3221

## 2021-02-07 NOTE — ED PROVIDER NOTES
Isela Goode is a 39year old female present emergency department by EMS from home due to drug overdose. Patient was found to be unresponsive at a friend's house. Patient's friend called EMS and patient was brought to emergency department for evaluation. Patient was given a total of 3 mg Narcan by EMS. Patient reported that she did use heroin earlier today. Patient is unsure the amount that she use. Patient stated that she snorted heroin and did not inject. Patient does not have any complaints and feels well. Patient denies any suicidal homicidal ideations. Patient denies using drugs and attempt to harm herself. The history is provided by the patient, the EMS personnel and medical records. Ingestion  Ingested substance:  Illicit drugs  Suspected agents: Heroin. Ingestion amount:  Unknown  Witnesses present: no    Incident location:  Another residence  Context: recreational    Associated symptoms: lethargy    Associated symptoms: no abdominal pain, no chest pain, no diaphoresis, no diarrhea, no headaches, no nausea, no shortness of breath and no vomiting    Risk factors: no hx of self injury         Review of Systems   Constitutional: Negative for chills, diaphoresis, fatigue and fever. Eyes: Negative for photophobia and visual disturbance. Respiratory: Negative for shortness of breath. Cardiovascular: Negative for chest pain and palpitations. Gastrointestinal: Negative for abdominal distention, abdominal pain, diarrhea, nausea and vomiting. Genitourinary: Negative for dysuria. Musculoskeletal: Negative for neck pain and neck stiffness. Skin: Negative for pallor and rash. Allergic/Immunologic: Negative for immunocompromised state. Neurological: Negative for headaches. Psychiatric/Behavioral: Negative for confusion. Physical Exam  Vitals signs and nursing note reviewed. Constitutional:       Appearance: She is not ill-appearing or diaphoretic.       Comments: Lethargic but easy to wake   HENT:      Head: Normocephalic and atraumatic. Eyes:      General: No scleral icterus. Comments: 3mm pupils equal bilaterally   Neck:      Musculoskeletal: Normal range of motion and neck supple. No neck rigidity or muscular tenderness. Cardiovascular:      Rate and Rhythm: Regular rhythm. Tachycardia present. Pulmonary:      Effort: Pulmonary effort is normal. No respiratory distress. Breath sounds: Normal breath sounds. No wheezing or rales. Chest:      Chest wall: No tenderness. Abdominal:      General: Bowel sounds are normal. There is no distension. Palpations: Abdomen is soft. Tenderness: There is no abdominal tenderness. There is no guarding or rebound. Musculoskeletal:      Right lower leg: No edema. Left lower leg: No edema. Skin:     General: Skin is warm and dry. Capillary Refill: Capillary refill takes less than 2 seconds. Comments: Track marks to both of her extremities   Neurological:      Mental Status: She is oriented to person, place, and time. Comments: Patient moving all extremities          Procedures     MDM  Number of Diagnoses or Management Options  Opiate overdose, accidental or unintentional, initial encounter [facility])  Diagnosis management comments: Mando Dick is a 39-year-old female present emergency department with drug overdose. Patient initially received 3 mg of Narcan before arrival to ED. Patient received an additional 1 mg intranasal Narcan at that time arrival to emergency department without improvement of symptoms. Patient was given additional 1 mg IV Narcan with significant improvement of symptoms. Patient was observed in emergency department and initially eloped from emergency department. Patient was found outside department by security and was brought back to ED. patient's IV line was removed at that time. Patient agreed to stay for observation. Patient was tolerating p.o. fluids and appeared well. department     New Prescriptions    No medications on file       Diagnosis:  1. Opiate overdose, accidental or unintentional, initial encounter ([facility] [address]        Disposition:  Patient's disposition: Eloped  Patient's condition is stable.             Marito Ghosh MD  Resident  02/07/21 5833

## 2021-02-07 NOTE — ED NOTES
Pt sat up and given orange juice. She couldn't drink it, given cranberry juice was able to drink that down.       Mt Arredondo RN  02/06/21 6201

## 2021-02-07 NOTE — ED NOTES
Per NATASHA [employer] pt is more awake and active, SW made attempt to interview, pt will not answer questions, repeatedly states she has \". .. committed no crime, I overdosed on heroin a little, that's all. \" Multiple statements that she wants discharged, that she will \"walk -out\" if not d/c immediately, states: \". .. they said I'm not pink slipped so I'm leaving. \"  Refuses to answer questions for assessment. Advised Dr Philip Js of situation, to make decision re: pink slip status.       [address] [place], [place] [address]ADAM mercado, [facility]  02/07/21 500 [facility], [place]  02/07/21 1137    Per Dr Philip Js pt discharged to home at her request.       [facility] [address], MSW, [place]  02/07/21 7913

## 2021-02-07 NOTE — ED NOTES
EKG performed and placed on Dr. Dennise Valenzuela computer with other copy placed in soft chart.       Veda Naranjo  02/07/21 0799

## 2021-02-07 NOTE — ED NOTES
Pt found walking in joe of [facility]. Pt states she was looking for the bathroom. IV removed for potential elopement risk.  Pt placed back on monitor and end tidal.      Elvira Lara RN  02/07/21 0004

## 2021-02-07 NOTE — ED NOTES
Pt fully awake. States her aunt is the head of nursing and will have me fired for keeping her here. Advised that I can help pt find a ride. Pt states \"You are fucking ridiculous\". Pt walked down joe states she is going to have a cigarette. ED physician aware.       Suman Maravilla RN  02/07/21 4435

## 2021-02-07 NOTE — ED NOTES
Bed: 17A-17  Expected date:   Expected time:   Means of arrival:   Comments:  dez Jones, PRITESH  02/06/21 2037

## 2021-02-07 NOTE — ED PROVIDER NOTES
[id number]    Department of Emergency Medicine   ED  Provider Note  ED Room: 17A/17A-17          History of Present Illness:  21, Time: 7:12 AM EST  Chief Complaint   Patient presents with    Drug Overdose     found at friend's, given 2mg Narcan nasally then 1mg iv, admits to heroin use                Eddie Douglas is a 39 y.o. female presenting to the ED for Drug OD, beginning a few min ago. The complaint has been intermittent, severe in severity, and worsened by using drugs. Patient seen earlier evening by overnight team.  Was seen for drug overdose. There were no suicidal homicidal indicators. Patient reported to them she smoked heroin, got Narcan via EMS and one IV. She was still somnolent and was given additional milligram of naloxone here. She showed significant improvement. She was observed, became more somnolent blood glucose was checked. She was found to have a blood glucose of 72. She ate. She showed improvement was ambulatory around the department and then eloped out of the department shortly after 6 AM.  A patient in the waiting room had been waiting to use the restroom for approximately 20 minutes and reported they saw this patient go in and had still been in the bath for 20 minutes. Bathroom was checked patient was somnolent, had pinpoint pupils and decreased respiratory status. She was brought back to her room placed on monitor oxygen Narcan ordered. Additional work-up ordered at this time      Review of Systems:   Unobtainable secondary to patient's mental status        --------------------------------------------- PAST HISTORY ---------------------------------------------  Past Medical History:  has a past medical history of Back pain. Past Surgical History:  has a past surgical history that includes  section and cyst removal.    Social History:  reports that she has been smoking. She has been smoking about 1.00 pack per day.  She has never used smokeless tobacco.  Drugs: IV and Opiates . She reports that she does not drink alcohol. Family History: family history is not on file. . Unless otherwise noted, family history is non contributory    The patients home medications have been reviewed. Allergies: Patient has no known allergies. 0700  ---------------------------------------------------PHYSICAL EXAM--------------------------------------    Constitutional/General: Somnolent, protecting airway, pinpoint pupils  Head: Normocephalic and atraumatic  Eyes: Pinpoint pupils  Mouth: Oropharynx clear, handling secretions, no trismus, no asymmetry of the posterior oropharynx or uvular edema  Neck: Supple, full ROM, no stridor, no meningeal signs  Respiratory: Diminished throughout. Protecting airway, not in respiratory distress  Cardiovascular: Cardiac and regular 2+ distal pulses. Equal extremity pulses. Chest: No chest wall tenderness  GI:  Abdomen Soft, Non tender, Non distended. No rebound, guarding, or rigidity. Musculoskeletal: Moves all extremities x 4. Warm and well perfused, no clubbing, cyanosis, or edema. Capillary refill <3 seconds  Integument: skin warm and dry. No rashes. Multiple track marks on the upper extremities with erythema of both hands consistent with injection sites  Neurologic: Glascow Coma Scale  Best Eye Response 2 - Opens eyes with pain   Best Verbal Response 3 - Talks, but nonsensical   Best Motor Response 4 - Moves part of body but does not remove noxious stimulus   Total 9           EKG: Interpreted by emergency department physician, Dr. Philip Js   This EKG is signed and interpreted by me. Rate: 95  Rhythm: Sinus  Interpretation: Sinus rhythm, normal axis, MD is 120, QRS is 82, QTc is 442.   No other acute findings no prior for comparison  Comparison: no previous EKG available      -------------------------------------------------- RESULTS -------------------------------------------------  I have personally reviewed all laboratory and imaging results for this patient. Results are listed below.      LABS: (Lab results interpreted by me)  Results for orders placed or performed during the hospital encounter of 02/06/21   CBC Auto Differential   Result Value Ref Range    WBC 9.1 4.5 - 11.5 E9/L    RBC 4.92 3.50 - 5.50 E12/L    Hemoglobin 13.6 11.5 - 15.5 g/dL    Hematocrit 43.3 34.0 - 48.0 %    MCV 88.0 80.0 - 99.9 fL    MCH 27.6 26.0 - 35.0 pg    MCHC 31.4 (L) 32.0 - 34.5 %    RDW 14.7 11.5 - 15.0 fL    Platelets 535 762 - 793 E9/L    MPV 10.9 7.0 - 12.0 fL    Neutrophils % 61.0 43.0 - 80.0 %    Immature Granulocytes % 0.7 0.0 - 5.0 %    Lymphocytes % 26.7 20.0 - 42.0 %    Monocytes % 9.7 2.0 - 12.0 %    Eosinophils % 1.5 0.0 - 6.0 %    Basophils % 0.4 0.0 - 2.0 %    Neutrophils Absolute 5.55 1.80 - 7.30 E9/L    Immature Granulocytes # 0.06 E9/L    Lymphocytes Absolute 2.43 1.50 - 4.00 E9/L    Monocytes Absolute 0.88 0.10 - 0.95 E9/L    Eosinophils Absolute 0.14 0.05 - 0.50 E9/L    Basophils Absolute 0.04 0.00 - 0.20 E9/L   Comprehensive Metabolic Panel   Result Value Ref Range    Sodium 138 132 - 146 mmol/L    Potassium 4.2 3.5 - 5.0 mmol/L    Chloride 103 98 - 107 mmol/L    CO2 26 22 - 29 mmol/L    Anion Gap 9 7 - 16 mmol/L    Glucose 99 74 - 99 mg/dL    BUN 10 6 - 20 mg/dL    CREATININE 0.8 0.5 - 1.0 mg/dL    GFR Non-African American >60 >=60 mL/min/1.73    GFR African American >60     Calcium 8.7 8.6 - 10.2 mg/dL    Total Protein 7.3 6.4 - 8.3 g/dL    Albumin 4.0 3.5 - 5.2 g/dL    Total Bilirubin 0.4 0.0 - 1.2 mg/dL    Alkaline Phosphatase 84 35 - 104 U/L    ALT 66 (H) 0 - 32 U/L    AST 44 (H) 0 - 31 U/L   Brain Natriuretic Peptide   Result Value Ref Range    Pro- (H) 0 - 125 pg/mL   Serum Drug Screen   Result Value Ref Range    Ethanol Lvl <10 mg/dL    Acetaminophen Level <5.0 (L) 10.0 - 97.0 mcg/mL    Salicylate, Serum <3.5 0.0 - 30.0 mg/dL    TCA Scrn NEGATIVE Cutoff:300 ng/mL   CK   Result Value Ref Range    Total CK 54 20 - 180 U/L   Arterial Blood Gas, Respiratory Only   Result Value Ref Range    Source: Arterial     pH, Blood Gas 7.331 (L) 7.350 - 7.450    pCO2, Arterial 54.2 (H) 35.0 - 45.0 mmHg    pO2, Arterial 87.0 80.0 - 100.0 mmHg    HCO3, Arterial 28.7 (H) 22.0 - 26.0 mmol/L    B.E. 1.5 (H) -3.0 - 0.0 mmol/L    O2 Sat 95.7 92.0 - 98.5 %    Potassium 4.1 3.5 - 5.5 mmol/L    HGB, (EST) 14.7 11.5 - 15.5 g/dL    HCT (EST) 43.0 34.0 - 48.0 %    Cardiopulmonary Bypass No      ID [id number]     DEVICE [id number]     Delivery Systems [employer]    POCT Glucose   Result Value Ref Range    Glucose 72 mg/dL    QC OK? yes    POCT Glucose   Result Value Ref Range    Meter Glucose 72 (L) 74 - 99 mg/dL   POCT Glucose   Result Value Ref Range    Meter Glucose 102 (H) 74 - 99 mg/dL   EKG 12 Lead   Result Value Ref Range    Ventricular Rate 95 BPM    Atrial Rate 95 BPM    P-R Interval 120 ms    QRS Duration 82 ms    Q-T Interval 352 ms    QTc Calculation (Bazett) 442 ms    P Axis 68 degrees    R Axis 36 degrees    T Axis 25 degrees   ,       RADIOLOGY:  Interpreted by Radiologist unless otherwise specified  XR CHEST PORTABLE   Final Result   Patchy discrete ground-glass density infiltrate at the bases main on the left   side. See above comments. Findings require correlation with clinical data.                       ------------------------- NURSING NOTES AND VITALS REVIEWED ---------------------------   The nursing notes within the ED encounter and vital signs as below have been reviewed by myself  BP (!) 120/94   Pulse 97   Temp 97.7 °F (36.5 °C)   Resp 10   SpO2 93%     Oxygen Saturation Interpretation: Normal    The cardiac monitor revealed NSR with a heart rate in the 110s as interpreted by me. The cardiac monitor was ordered secondary to the patient's heart rate and to monitor the patient for dysrhythmia. CPT 52391    The patients available past medical records and past encounters were reviewed. ------------------------------ ED COURSE/MEDICAL DECISION MAKING----------------------  Medications   naloxone (NARCAN) injection 1 mg (has no administration in time range)   naloxone [facility]) injection 1 mg (1 mg Intravenous Given 2/6/21 7822)   naloxone [facility]) injection 0.4 mg (0.4 mg Nasal Given 2/7/21 3595)   naloxone [facility]) injection 4 mg (4 mg Nebulization Given 2/7/21 7591)                    Medical Decision Making:     I, Dr. Ofelia Correia am the primary provider of record    Patient initially seen by the night team, eloped out of the department. She was found with pinpoint pupils and unresponsive in the bathroom. She was given further doses of Narcan. She had improvement. Observed in the department. At 11 AM ANO x3 not hypoxic ambulatory in the department. She refused to speak with peer services or . She specifically denies suicidal homicidal ideation visual auditory hallucinations. Discussed that she has had a significant mount of Narcan and recommended observation stay in the hospital.  She has refused Covid testing urinalysis urine pregnancy and urine drug test.  She states \"the nurses here ignorant and not staying in the hospital.  She was not amenable to detox nor rehab. Refusing further observation or care. She signed out [address]      Re-Evaluations:       Time: 0800  Re-evaluation. Patients symptoms are improving  Repeat physical examination is improved -patient more alert. Resting comfortably. Still pending observation and results. Time: 1000  Re-evaluation. Patients symptoms show no change  Repeat physical examination is not changed    Time: 1100  Re-evaluation. Patients symptoms are improving  Repeat physical examination is improved -patient alert. Ambulatory. Not hypoxic. Refusing further evaluation. She is refused to speak to peer services and .   States \"my mom is a nurse and works here, the nurses are ignorant, I am not staying. \"  She is ANO x3. Specifically denies suicidal homicidal ideation. She leaves AGAINST MEDICAL ADVICE      Unfortunately, Radha Echavarria at 11:36 AM decided to leave the Emergency Department Against Medical Advice. Radha Echavarria is clinically sober, free of any distracting injury, appears to be of sound mind with intact judgement and insight, and in my opinion has the capacity to make decisions. she presented to the Emergency Department to be evaluated for Drug Overdose (found at friend's, given 2mg Narcan nasally then 1mg iv, admits to heroin use)   and understands that I am concerned about the possibility of prolonged overdose hypoxia disability and death. I have explained the nature of the evaluation so for and she understands the results and that the evaluation so far has been limited and cannot exclude further needs of naloxone and that by not undergoing further evaluation and management specific risks include: Permanent disability and death. We have discussed alternative treatments and the patient was and referred to Primary care referral line as well as outpatient resources  Still, Radha Echavarria is unwilling to stay for the recommended evaluation and management and wishes to leave against medical advice. I am unable to convince the patient to stay, I have asked them to return as soon as possible to complete their evaluation. I have answered all their questions     This patient's ED course included: a personal history and physicial examination, multiple bedside re-evaluations, cardiac monitoring, continuous pulse oximetry and complex medical decision making and emergency management    This patient has been closely monitored during their ED course. Consultations:  Social Work       Critical Care:  Please note that the withdrawal or failure to initiate urgent interventions for this patient would likely result in a life threatening deterioration or permanent disability. Accordingly this patient received 32 minutes of critical care time, excluding separately billable procedures. Counseling: The emergency provider has spoken with the patient and discussed todays results, in addition to providing specific details for the plan of care and counseling regarding the diagnosis and prognosis. Questions are answered at this time and they are agreeable with the plan.       --------------------------------- IMPRESSION AND DISPOSITION ---------------------------------    IMPRESSION  1. Opiate overdose, accidental or unintentional, [facility]    2. Altered mental status, unspecified altered mental status type        DISPOSITION  Disposition: Other Disposition: Left AMA  Patient condition is stable        NOTE: This report was transcribed using voice recognition software.  Every effort was made to ensure accuracy; however, inadvertent computerized transcription errors may be present       Leonidas Duffy DO  02/07/21 1138

## 2021-02-07 NOTE — ED NOTES
02 level drops when pt falls asleep, 3L NC and continuous pulse ox and end tidal monitor on pt      Vibha Haas RN  02/06/21 5203

## 2021-02-07 NOTE — CARE COORDINATION
This note will not be viewable in [employer] for the following reason(s). Suspected substance abuse disorder. Peer Recovery Support Note    Name: Harjit Reyna  Date: 2/7/2021    Chief Complaint   Patient presents with    Drug Overdose     found at friend's, given 2mg Narcan nasally then 1mg iv, admits to heroin use       Peer Support met with patient. [] Support and education provided  [] Resources provided   [] Treatment referral:   [] Other:   [] Patient declined peer recovery services     Referred By:     Notes: Patient would not wake up to talk with me. Will go back and see her when she's more alert.     Hattie Ochoa, 2/7/2021

## 2021-02-07 NOTE — ED NOTES
SW attempted to interview pt as pt now sitting up in bed, patient cannot remain alert, does not acknowledge SW presence, drooling heavily. Discussed with Martina Sosa who reports pt continues to be basically non-responsive. Discussed with Dr Delio Tena that pt cannot be assessed at this time, Dr Radha Skelton reports pt may need further medical assessment to monitor condition, SW consult on hold at this point. SW will re-assess situation at 1230/1300.      [address] [place] [address], [place], [place]  02/07/21 1101

## 2021-08-03 ENCOUNTER — TELEPHONE (OUTPATIENT)
Dept: PRIMARY CARE CLINIC | Age: 36
End: 2021-08-03

## 2021-08-03 NOTE — TELEPHONE ENCOUNTER
----- Message from Shark Punch sent at 8/3/2021  2:14 PM EDT -----  Subject: Appointment Request    Reason for Call: New Patient Request Appointment    QUESTIONS  Type of Appointment? Established Patient  Reason for appointment request? No appointments available during search  Additional Information for Provider? patient would like to est as new pcp   with Dr. Mary Lou Villela seen by Dr. Courtney   ---------------------------------------------------------------------------  --------------  [address]  What is the best way for the office to contact you? OK to leave message on   voicemail  Preferred Call Back Phone Number? [phone]  ---------------------------------------------------------------------------  --------------  SCRIPT ANSWERS  Relationship to Patient? Self  Specialty Confirmation? Primary Care  Is this the first appointment to establish care for a ? No  Have you been diagnosed with, awaiting test results for, or told that you   are suspected of having COVID-19 (Coronavirus)? (If patient has tested   negative or was tested as a requirement for work, school, or travel and   not based on symptoms, answer no)? No  Do you currently have flu-like symptoms including fever or chills, cough,   shortness of breath, difficulty breathing, or new loss of taste or smell? No  Have you had close contact with someone with COVID-19 in the last 14 days? No  (Service Expert  click yes below to proceed with evidanza As Usual   Scheduling)?  Yes

## 2021-08-11 ENCOUNTER — OFFICE VISIT (OUTPATIENT)
Dept: FAMILY MEDICINE CLINIC | Age: 36
End: 2021-08-11
Payer: MEDICARE

## 2021-08-11 ENCOUNTER — TELEPHONE (OUTPATIENT)
Dept: GASTROENTEROLOGY | Age: 36
End: 2021-08-11

## 2021-08-11 ENCOUNTER — TELEPHONE (OUTPATIENT)
Dept: FAMILY MEDICINE CLINIC | Age: 36
End: 2021-08-11

## 2021-08-11 VITALS
BODY MASS INDEX: 24.08 KG/M2 | SYSTOLIC BLOOD PRESSURE: 122 MMHG | RESPIRATION RATE: 16 BRPM | OXYGEN SATURATION: 100 % | HEART RATE: 56 BPM | DIASTOLIC BLOOD PRESSURE: 78 MMHG | HEIGHT: 69 IN | WEIGHT: 162.6 LBS | TEMPERATURE: 97.8 F

## 2021-08-11 DIAGNOSIS — Z00.00 ROUTINE HEALTH MAINTENANCE: Primary | ICD-10-CM

## 2021-08-11 DIAGNOSIS — F32.A DEPRESSION, UNSPECIFIED DEPRESSION TYPE: ICD-10-CM

## 2021-08-11 DIAGNOSIS — I38 ENDOCARDITIS, UNSPECIFIED CHRONICITY, UNSPECIFIED ENDOCARDITIS TYPE: ICD-10-CM

## 2021-08-11 DIAGNOSIS — B19.20 HEPATITIS C VIRUS INFECTION WITHOUT HEPATIC COMA, UNSPECIFIED CHRONICITY: ICD-10-CM

## 2021-08-11 DIAGNOSIS — F17.200 NICOTINE USE DISORDER: ICD-10-CM

## 2021-08-11 DIAGNOSIS — F33.2 SEVERE EPISODE OF RECURRENT MAJOR DEPRESSIVE DISORDER, WITHOUT PSYCHOTIC FEATURES (HCC): ICD-10-CM

## 2021-08-11 PROCEDURE — G8427 DOCREV CUR MEDS BY ELIG CLIN: HCPCS | Performed by: STUDENT IN AN ORGANIZED HEALTH CARE EDUCATION/TRAINING PROGRAM

## 2021-08-11 PROCEDURE — G8420 CALC BMI NORM PARAMETERS: HCPCS | Performed by: STUDENT IN AN ORGANIZED HEALTH CARE EDUCATION/TRAINING PROGRAM

## 2021-08-11 PROCEDURE — 99203 OFFICE O/P NEW LOW 30 MIN: CPT | Performed by: STUDENT IN AN ORGANIZED HEALTH CARE EDUCATION/TRAINING PROGRAM

## 2021-08-11 PROCEDURE — 4004F PT TOBACCO SCREEN RCVD TLK: CPT | Performed by: STUDENT IN AN ORGANIZED HEALTH CARE EDUCATION/TRAINING PROGRAM

## 2021-08-11 RX ORDER — FLUOXETINE 10 MG/1
10 CAPSULE ORAL DAILY
Qty: 30 CAPSULE | Refills: 3 | Status: SHIPPED
Start: 2021-08-11 | End: 2021-09-08 | Stop reason: DRUGHIGH

## 2021-08-11 RX ORDER — NALOXONE HYDROCHLORIDE 4 MG/.1ML
4 SPRAY NASAL PRN
COMMUNITY
Start: 2020-12-24 | End: 2021-08-11

## 2021-08-11 SDOH — ECONOMIC STABILITY: FOOD INSECURITY: WITHIN THE PAST 12 MONTHS, THE FOOD YOU BOUGHT JUST DIDN'T LAST AND YOU DIDN'T HAVE MONEY TO GET MORE.: NEVER TRUE

## 2021-08-11 SDOH — ECONOMIC STABILITY: FOOD INSECURITY: WITHIN THE PAST 12 MONTHS, YOU WORRIED THAT YOUR FOOD WOULD RUN OUT BEFORE YOU GOT MONEY TO BUY MORE.: NEVER TRUE

## 2021-08-11 ASSESSMENT — ENCOUNTER SYMPTOMS
RHINORRHEA: 0
BACK PAIN: 0
EYE PAIN: 0
SINUS PRESSURE: 0
COUGH: 0
SORE THROAT: 0
SINUS PAIN: 0
EYE REDNESS: 0
CONSTIPATION: 0
SHORTNESS OF BREATH: 0
NAUSEA: 0
DIARRHEA: 0
VOMITING: 0
ABDOMINAL PAIN: 0

## 2021-08-11 ASSESSMENT — SOCIAL DETERMINANTS OF HEALTH (SDOH): HOW HARD IS IT FOR YOU TO PAY FOR THE VERY BASICS LIKE FOOD, HOUSING, MEDICAL CARE, AND HEATING?: NOT HARD AT ALL

## 2021-08-11 NOTE — TELEPHONE ENCOUNTER
PT NEEDS THE CARDIOLOGIST REFERRAL FOR DR CRISTAL TREVIÑO  SHE IS ALREADY ESTABLISHED THERE they just need the referral to keep billing

## 2021-08-11 NOTE — TELEPHONE ENCOUNTER
Called and spoke with patient regarding referral from Dr. Milton Cranker. Pt is scheduled to see Dr. Ish Cherry on 08/19/2021 @ 10am in the [place] office. Pt verbalized understanding.    Electronically signed by Ciarra San MA on 8/11/2021 at 3:35 PM

## 2021-08-11 NOTE — PATIENT INSTRUCTIONS
Patient Education        Depression Treatment: Care Instructions  Your Care Instructions     Depression is a condition that affects the way you feel, think, and act. It causes symptoms such as low energy, loss of interest in daily activities, and sadness or grouchiness that goes on for a long time. Depression is very common and affects men and women of all ages. Depression is a medical illness caused by changes in the natural chemicals in your brain. It is not a character flaw, and it does not mean that you are a bad or weak person. It does not mean that you are going crazy. It is important to know that depression can be treated. Medicines, counseling, and self-care can all help. Many people do not get help because they are embarrassed or think that they will get over the depression on their own. But some people do not get better without treatment. Follow-up care is a key part of your treatment and safety. Be sure to make and go to all appointments, and call your doctor if you are having problems. It's also a good idea to know your test results and keep a list of the medicines you take. How can you care for yourself at home? Learn about antidepressant medicines  Antidepressant medicines can improve or end the symptoms of depression. You may need to take the medicine for at least 6 months, and often longer. Keep taking your medicine even if you feel better. If you stop taking it too soon, your symptoms may come back or get worse. You may start to feel better within 1 to 3 weeks of taking antidepressant medicine. But it can take as many as 6 to 8 weeks to see more improvement. Talk to your doctor if you have problems with your medicine or if you do not notice any improvement after 3 weeks. Antidepressants can make you feel tired, dizzy, or nervous. Some people have dry mouth, constipation, headaches, sexual problems, an upset stomach, or diarrhea.  Many of these side effects are mild and go away on their own after you take the medicine for a few weeks. Some may last longer. Talk to your doctor if side effects bother you too much. You might be able to try a different medicine. If you are pregnant or breastfeeding, talk to your doctor about what medicines you can take. Learn about counseling  In many cases, counseling can work as well as medicines to treat mild to moderate depression. Counseling is done by licensed mental health providers, such as psychologists, social workers, and some types of nurses. It can be done in one-on-one sessions or in a group setting. Many people find group sessions helpful. Cognitive-behavioral therapy is a type of counseling. In this treatment, you learn how to see and change unhelpful thinking styles that may be adding to your depression. Counseling and medicines often work well when used together. When should you call for help? Call 911 anytime you think you may need emergency care. For example, call if:    · You feel you cannot stop from hurting yourself or someone else. Call your doctor now or seek immediate medical care if:    · You hear voices.     · You feel much more depressed. Watch closely for changes in your health, and be sure to contact your doctor if:    · You are having problems with your depression medicine.     · You are not getting better as expected. Where can you learn more? Go to https://[employer]. org and sign in to your Somoto account. Enter N290 in the SoupQubes box to learn more about \"Depression Treatment: Care Instructions. \"     If you do not have an account, please click on the \"Sign Up Now\" link. Current as of: September 23, 2020               Content Version: 12.9  © 9086-5102 Healthwise, Incorporated. Care instructions adapted under license by [facility] ([facility]).  If you have questions about a medical condition or this instruction, always ask your healthcare professional. Jj Stevens disclaims any warranty or liability for your use of this information. Patient Education        Recovering From Depression: Care Instructions  Your Care Instructions     Taking good care of yourself is important as you recover from depression. In time, your symptoms will fade as your treatment takes hold. Do not give up. Instead, focus your energy on getting better. Your mood will improve. It just takes some time. Focus on things that can help you feel better, such as being with friends and family, eating well, and getting enough rest. But take things slowly. Do not do too much too soon. You will begin to feel better gradually. Follow-up care is a key part of your treatment and safety. Be sure to make and go to all appointments, and call your doctor if you are having problems. It's also a good idea to know your test results and keep a list of the medicines you take. How can you care for yourself at home? Be realistic  · If you have a large task to do, break it up into smaller steps you can handle, and just do what you can. · You may want to put off important decisions until your depression has lifted. If you have plans that will have a major impact on your life, such as marriage, divorce, or a job change, try to wait a bit. Talk it over with friends and loved ones who can help you look at the overall picture first.  · Reaching out to people for help is important. Do not isolate yourself. Let your family and friends help you. Find someone you can trust and confide in, and talk to that person. · Be patient, and be kind to yourself. Remember that depression is not your fault and is not something you can overcome with willpower alone. Treatment is important for depression, just like for any other illness. Feeling better takes time, and your mood will improve little by little. Stay active  · Stay busy and get outside. Take a walk, or try some other light exercise.   · Talk with your doctor about an exercise program. Exercise can help with mild depression. · Go to a movie or concert. Take part in a Judaism activity or other social gathering. Go to a ball game. · Ask a friend to have dinner with you. Take care of yourself  · Eat a balanced diet with plenty of fresh fruits and vegetables, whole grains, and lean protein. If you have lost your appetite, eat small snacks rather than large meals. · Avoid using illegal drugs or marijuana and drinking alcohol. Do not take medicines that have not been prescribed for you. They may interfere with medicines you may be taking for depression, or they may make your depression worse. · Take your medicines exactly as they are prescribed. You may start to feel better within 1 to 3 weeks of taking antidepressant medicine. But it can take as many as 6 to 8 weeks to see more improvement. If you have questions or concerns about your medicines, or if you do not notice any improvement by 3 weeks, talk to your doctor. · Continue to take your medicine after your symptoms improve. Taking your medicine for at least 6 months after you feel better can help keep you from getting depressed again. If this isn't the first time you have been depressed, your doctor may recommend you to take medicine even longer. · If you have any side effects from your medicine, tell your doctor. Many side effects are mild and will go away on their own after you have been taking the medicine for a few weeks. Some may last longer. Talk to your doctor if side effects are bothering you too much. You might be able to try a different medicine. · Continue counseling. It may help prevent depression from returning, especially if you've had multiple episodes of depression. Talk with your counselor if you are having a hard time attending your sessions or you think the sessions aren't working. Don't just stop going. · Get enough sleep. Talk to your doctor if you are having problems sleeping.   · Avoid sleeping pills unless they are prescribed by the doctor treating your depression. Sleeping pills may make you groggy during the day, and they may interact with other medicine you are taking. · If you have any other illnesses, such as diabetes, heart disease, or high blood pressure, make sure to continue with your treatment. Tell your doctor about all of the medicines you take, including those with or without a prescription. · If you or someone you know talks about suicide, self-harm, or feeling hopeless, get help right away. Call the [address] [place] [address] at 4-269-500-EKKS ([phone]) or text HOME to 493513 to access the Crisis Text Line. Consider saving these numbers in your phone. When should you call for help? Call 508 anytime you think you may need emergency care. For example, call if:    · You feel like hurting yourself or someone else.     · Someone you know has depression and is about to attempt or is attempting suicide. Call your doctor now or seek immediate medical care if:    · You hear voices.     · Someone you know has depression and:  ? Starts to give away his or her possessions. ? Uses illegal drugs or drinks alcohol heavily. ? Talks or writes about death, including writing suicide notes or talking about guns, knives, or pills. ? Starts to spend a lot of time alone. ? Acts very aggressively or suddenly appears calm. Watch closely for changes in your health, and be sure to contact your doctor if:    · You do not get better as expected. Where can you learn more? Go to https://InTownulices.[employer]MyGear. org and sign in to your Reonomy account. Enter Y078 in the [facility] box to learn more about \"Recovering From Depression: Care Instructions. \"     If you do not have an account, please click on the \"Sign Up Now\" link. Current as of: September 23, 2020               Content Version: 12.9  © 6562-9050 Healthwise, Incorporated. Care instructions adapted under license by [address]  If you have questions about a medical condition or this instruction, always ask your healthcare professional. [address] any warranty or liability for your use of this information. Patient Education        Learning About Benefits From Quitting Smoking  How does quitting smoking make you healthier? If you're thinking about quitting smoking, you may have a few reasons to be smoke-free. Your health may be one of them. · When you quit smoking, you lower your risks for cancer, lung disease, heart attack, stroke, blood vessel disease, and blindness from macular degeneration. · When you're smoke-free, you get sick less often, and you heal faster. You are less likely to get colds, flu, bronchitis, and pneumonia. · As a nonsmoker, you may find that your mood is better and you are less stressed. When and how will you feel healthier? Quitting has real health benefits that start from day 1 of being smoke-free. And the longer you stay smoke-free, the healthier you get and the better you feel. The first hours  · After just 20 minutes, your blood pressure and heart rate go down. That means there's less stress on your heart and blood vessels. · Within 12 hours, the level of carbon monoxide in your blood drops back to normal. That makes room for more oxygen. With more oxygen in your body, you may notice that you have more energy than when you smoked. After 2 weeks  · Your lungs start to work better. · Your risk of heart attack starts to drop. After 1 month  · When your lungs are clear, you cough less and breathe deeper, so it's easier to be active. · Your sense of taste and smell return. That means you can enjoy food more than you have since you started smoking. Over the years  · Over the years, your risks of heart disease, heart attack, and stroke are lower. · After 10 years, your risk of dying from lung cancer is cut by about half. And your risk for many other types of cancer is lower too.   How would quitting help others in your life? When you quit smoking, you improve the health of everyone who now breathes in your smoke. · Their heart, lung, and cancer risks drop, much like yours. · They are sick less. For babies and small children, living smoke-free means they're less likely to have ear infections, pneumonia, and bronchitis. · If you're a woman who is or will be pregnant someday, quitting smoking means a healthier . · Children who are close to you are less likely to become adult smokers. Where can you learn more? Go to https://BusbudpepicewPlaceFirst.[facility]. org and sign in to your StepOne account. Enter [id number] in the DriveK box to learn more about \"Learning About Benefits From Quitting Smoking. \"     If you do not have an account, please click on the \"Sign Up Now\" link. Current as of: 2021               Content Version: 12.9   [employer]. Care instructions adapted under license by [facility] ([facility]). If you have questions about a medical condition or this instruction, always ask your healthcare professional. [place] [address] any warranty or liability for your use of this information. Patient Education        Stopping Smoking: Care Instructions  Your Care Instructions     Cigarette smokers crave the nicotine in cigarettes. Giving it up is much harder than simply changing a habit. Your body has to stop craving the nicotine. It is hard to quit, but you can do it. There are many tools that people use to quit smoking. You may find that combining tools works best for you. There are several steps to quitting. First you get ready to quit. Then you get support to help you. After that, you learn new skills and behaviors to become a nonsmoker. For many people, a necessary step is getting and using medicine. Your doctor will help you set up the plan that best meets your needs.  You may want to attend a smoking cessation program to help you quit smoking. When you choose a program, look for one that has proven success. Ask your doctor for ideas. You will greatly increase your chances of success if you take medicine as well as get counseling or join a cessation program.  Some of the changes you feel when you first quit tobacco are uncomfortable. Your body will miss the nicotine at first, and you may feel short-tempered and grumpy. You may have trouble sleeping or concentrating. Medicine can help you deal with these symptoms. You may struggle with changing your smoking habits and rituals. The last step is the tricky one: Be prepared for the smoking urge to continue for a time. This is a lot to deal with, but keep at it. You will feel better. Follow-up care is a key part of your treatment and safety. Be sure to make and go to all appointments, and call your doctor if you are having problems. It's also a good idea to know your test results and keep a list of the medicines you take. How can you care for yourself at home? · Ask your family, friends, and coworkers for support. You have a better chance of quitting if you have help and support. · Join a support group, such as Nicotine Anonymous, for people who are trying to quit smoking. · Consider signing up for a smoking cessation program, such as the American Lung Association's Freedom from Smoking program.  · Get text messaging support. Go to the website at www.smokefree. gov to sign up for the [facility] program.  · Set a quit date. Pick your date carefully so that it is not right in the middle of a big deadline or stressful time. Once you quit, do not even take a puff. Get rid of all ashtrays and lighters after your last cigarette. Clean your house and your clothes so that they do not smell of smoke. · Learn how to be a nonsmoker. Think about ways you can avoid those things that make you reach for a cigarette. ? Avoid situations that put you at greatest risk for smoking.  For some have questions about a medical condition or this instruction, always ask your healthcare professional. [place] [address] any warranty or liability for your use of this information.

## 2021-08-11 NOTE — PROGRESS NOTES
2021    Boby Durham (:  1985) is a 39 y.o. female, here for evaluation of the following medical concerns:    HPI  Chief Complaint   Patient presents with   [address] [facility] [address] Depression     Patient is a 50-year-old female here today to establish care with myself. She has a past medical history of drug overdose anxiety and depression nicotine use. She has no known allergies she has had an ovarian cyst removal and a . Patient is currently on suboxone. She has history of abnormal pap smear. They did a colposcopy she thinks but is unsure. She had been an IVDU for 13 years. She used pain pills prior to that she got it after she had her son and the doctor kept giving them to her. Then she bought them off the street. 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 She ended up with hepatitis. She had to get an abscess removed and found to have endocarditis. She now wants to go see cardiology. She has been clean for 6 months. She is having swelling in her hands and feet    She does not like going in public. She has a lot of anxiety and nervousness she feels like a weight is on her chest. She does not want to run into anyone that she used to do drugs with. Depression  Patient complains of depression. She complains of anhedonia, depressed mood, difficulty concentrating, fatigue, feelings of worthlessness/guilt, hopelessness and weight loss. Onset was approximately several years ago. Symptoms have been gradually worsening since that time. Current symptoms include: anhedonia, depressed mood, difficulty concentrating, fatigue, feelings of worthlessness/guilt, hopelessness and weight loss. Patient denies recurrent thoughts of death, suicidal attempt, suicidal thoughts with specific plan and suicidal thoughts without plan. Family history significant for alcoholism, anxiety, depression and substance abuse. Possible organic causes contributing are: drug abuse.  Risk factors: positive family history in  aunt, brother(s), father, grandparents, mother, sister(s) and uncle. Previous treatment includes individual therapy and medication. She complains of the following side effects from the treatment: none. She was in [facility] in the past when she was younger. Review of Systems   Constitutional: Negative for chills, fatigue and fever. HENT: Negative for congestion, ear pain, postnasal drip, rhinorrhea, sinus pressure, sinus pain and sore throat. Eyes: Negative for pain and redness. Respiratory: Negative for cough and shortness of breath. Cardiovascular: Negative for chest pain. Gastrointestinal: Negative for abdominal pain, constipation, diarrhea, nausea and vomiting. Genitourinary: Negative for difficulty urinating and dysuria. Musculoskeletal: Negative for back pain, myalgias and neck pain. Skin: Negative for rash. Neurological: Negative for dizziness, light-headedness and numbness. Psychiatric/Behavioral: Positive for dysphoric mood. The patient is nervous/anxious. Panic attacks       Prior to Visit Medications    Medication Sig Taking?  Authorizing Provider   Buprenorphine HCl-Naloxone HCl (SUBOXONE SL) Place 8 mg under the tongue 2 times daily Yes Historical Provider, MD   FLUoxetine (PROZAC) 10 MG capsule Take 1 capsule by mouth daily Yes Anamika Li DO   ibuprofen (IBU) 800 MG tablet Take 1 tablet by mouth every 8 hours as needed for Pain  Leopold Rei, APRN - CNP        No Known Allergies    Past Medical History:   Diagnosis Date    Back pain     Endocarditis     Hepatitis C     Substance abuse ([facility] [address]        Past Surgical History:   Procedure Laterality Date     SECTION      CYST REMOVAL      ovary       Social History     Socioeconomic History    Marital status: Legally      Spouse name: Not on file    Number of children: Not on file    Years of education: Not on file    Highest education level: Not on file   Occupational History    Not on file Tobacco Use    Smoking status: Current Every Day Smoker     Packs/day: 1.00    Smokeless tobacco: Never Used   Vaping Use    Vaping Use: Never used   Substance and Sexual Activity    Alcohol use: No    Drug use: Not on file     Comment: heroin oxy. last used heroin about 8 hrs ago or less     Sexual activity: Not on file   Other Topics Concern    Not on file   Social History Narrative    Not on file     Social Determinants of Health     Financial Resource Strain: Low Risk     Difficulty of Paying Living Expenses: Not hard at all   Food Insecurity: No Food Insecurity    Worried About [address] [employer] in the Last Year: Never true    [address] [facility] [address] in the Last Year: Never true   Transportation Needs:     Lack of Transportation (Medical):  Lack of Transportation (Non-Medical):    Physical Activity:     Days of Exercise per Week:     Minutes of Exercise per Session:    Stress:     Feeling of Stress :    Social Connections:     Frequency of Communication with Friends and Family:     Frequency of Social Gatherings with Friends and Family:     Attends Congregational Services:     Active Member of Clubs or Organizations:     Attends Club or Organization Meetings:     Marital Status:    Intimate Partner Violence:     Fear of Current or Ex-Partner:     Emotionally Abused:     Physically Abused:     Sexually Abused:         Family History   Problem Relation Age of Onset    Osteoporosis Mother     Substance Abuse Father            Vitals:    08/11/21 1347   BP: 122/78   Pulse: 56   Resp: 16   Temp: 97.8 °F (36.6 °C)   SpO2: 100%   Weight: 162 lb 9.6 oz (73.8 kg)   Height: 5' 8.5\" (1.74 m)     Estimated body mass index is 24.36 kg/m² as calculated from the following:    Height as of this encounter: 5' 8.5\" (1.74 m). Weight as of this encounter: 162 lb 9.6 oz (73.8 kg).     Most Recent Labs  CBC  Lab Results   Component Value Date    WBC 9.1 02/07/2021    WBC 10.7 11/24/2018    WBC 11.9 10/28/2013 RBC 4.92 02/07/2021    RBC 5.62 11/24/2018    RBC 5.59 10/28/2013    HGB 13.6 02/07/2021    HGB 15.3 11/24/2018    HGB 15.4 10/28/2013    HCT 43.3 02/07/2021    HCT 47.5 11/24/2018    HCT 47.2 10/28/2013    MCV 88.0 02/07/2021    MCV 84.5 11/24/2018    MCV 84.4 10/28/2013     02/07/2021     11/24/2018     10/28/2013      CMP  Lab Results   Component Value Date     02/07/2021     11/24/2018     10/28/2013    K 4.1 02/07/2021    K 4.2 02/07/2021    K 3.9 11/24/2018     02/07/2021     11/24/2018     10/28/2013    CO2 26 02/07/2021    CO2 25 11/24/2018    CO2 30 10/28/2013    ANIONGAP 9 02/07/2021    ANIONGAP 14 11/24/2018    GLUCOSE 99 02/07/2021    GLUCOSE 72 02/06/2021    GLUCOSE 91 11/24/2018    GLUCOSE 80 04/24/2011    BUN 10 02/07/2021    BUN 16 11/24/2018    BUN 10 10/28/2013    CREATININE 0.8 02/07/2021    CREATININE 1.0 11/24/2018    CREATININE 0.9 10/28/2013    LABGLOM >60 02/07/2021    LABGLOM >60 11/24/2018    LABGLOM >60 10/28/2013    LABGLOM >60 04/24/2011    GFRAA >60 02/07/2021    GFRAA >60 11/24/2018    CALCIUM 8.7 02/07/2021    CALCIUM 9.6 11/24/2018    CALCIUM 9.4 10/28/2013    PROT 7.3 02/07/2021    PROT 7.9 11/24/2018    PROT 8.2 10/28/2013    LABALBU 4.0 02/07/2021    LABALBU 4.4 11/24/2018    LABALBU 4.6 10/28/2013    LABALBU 3.1 04/24/2011    BILITOT 0.4 02/07/2021    BILITOT 0.2 11/24/2018    BILITOT 0.5 10/28/2013    ALKPHOS 84 02/07/2021    ALKPHOS 76 11/24/2018    ALKPHOS 74 10/28/2013    AST 44 02/07/2021    AST 7 11/24/2018    AST 11 10/28/2013    ALT 66 02/07/2021    ALT 6 11/24/2018    ALT 6 10/28/2013     A1C  Lab Results   Component Value Date    LABA1C 5.4 11/24/2018     TSH  Lab Results   Component Value Date    TSH 0.580 10/28/2013     FREET4  Lab Results   Component Value Date    J1UERMO 5.5 10/28/2013     LIPID  Lab Results   Component Value Date    CHOL 166 11/24/2018    HDL 47 11/24/2018    LDLCALC 92 11/24/2018    TRIG No focal deficit present. Mental Status: She is alert and oriented to person, place, and time. Psychiatric:         Mood and Affect: Mood normal.         Behavior: Behavior normal.         Thought Content: Thought content normal.         ASSESSMENT/PLAN:  Santiago Franco was seen today for establish care and depression. Diagnoses and all orders for this visit:    Routine health maintenance  -     CBC Auto Differential; Future  -     Comprehensive Metabolic Panel; Future  -     Hemoglobin A1C; Future  -     TSH without Reflex; Future  -     Lipid Panel; Future    Nicotine use disorder    Severe episode of recurrent major depressive disorder, without psychotic features (HCC)  -     FLUoxetine (PROZAC) 10 MG capsule; Take 1 capsule by mouth daily    Depression, unspecified depression type  -     FLUoxetine (PROZAC) 10 MG capsule; Take 1 capsule by mouth daily    Endocarditis, unspecified chronicity, unspecified endocarditis type  -     [address] Cardiology    Hepatitis C virus infection without hepatic coma, unspecified chronicity  -     Susy Pruett MD, Gastroenterology, [facility]       Patient wants to quit smoking on her own. Educational materials and/or home exercises printed for patient's review and were included in patient instructions on his/her After Visit Summary and given to patient at the end of visit. Counseled regarding above diagnosis, including possible risks and complications,  especially if left uncontrolled. Counseled regarding the possible side effects, risks, benefits and alternatives to treatment; patient and/or guardian verbalizes understanding, agrees, feels comfortable with and wishes to proceed with above treatment plan. Advised patient to call with any new medication issues, and read all Rx info from pharmacy to assure aware of all possible risks and side effects of medication before taking.      Reviewed age and gender appropriate health screening exams and vaccinations. Advised patient regarding importance of keeping up with recommended health maintenance and to schedule as soon as possible if overdue, as this is important in assessing for undiagnosed pathology, especially cancer, as well as protecting against potentially harmful/life threatening disease. Patient and/or guardian verbalizes understanding and agrees with above counseling, assessment and plan. All questions answered. Return in about 4 weeks (around 9/8/2021) for anxiety. An  electronic signature was used to authenticate this note.     --Aminah Menendez, DO on 8/11/2021 at 2:13 PM

## 2021-08-19 ENCOUNTER — INITIAL CONSULT (OUTPATIENT)
Dept: GASTROENTEROLOGY | Age: 36
End: 2021-08-19
Payer: MEDICARE

## 2021-08-19 ENCOUNTER — TELEPHONE (OUTPATIENT)
Dept: GASTROENTEROLOGY | Age: 36
End: 2021-08-19

## 2021-08-19 VITALS
SYSTOLIC BLOOD PRESSURE: 102 MMHG | HEART RATE: 65 BPM | WEIGHT: 161.7 LBS | BODY MASS INDEX: 24.51 KG/M2 | RESPIRATION RATE: 18 BRPM | OXYGEN SATURATION: 95 % | DIASTOLIC BLOOD PRESSURE: 68 MMHG | HEIGHT: 68 IN | TEMPERATURE: 98.2 F

## 2021-08-19 DIAGNOSIS — B19.20 HEPATITIS C VIRUS INFECTION WITHOUT HEPATIC COMA, UNSPECIFIED CHRONICITY: Primary | ICD-10-CM

## 2021-08-19 PROCEDURE — 99241 PR OFFICE CONSULTATION NEW/ESTAB PATIENT 15 MIN: CPT | Performed by: STUDENT IN AN ORGANIZED HEALTH CARE EDUCATION/TRAINING PROGRAM

## 2021-08-19 NOTE — PATIENT INSTRUCTIONS
Patient Education        Hepatitis C Viral Load: About This Test  What is a hepatitis C viral load test?     A viral load test measures how much hepatitis C virus is in the blood. Viral load may be measured before, during, and after treatment. Why is the test done? A hepatitis C viral load test is done to see if the treatment is working. It's also used after treatment to see if it was effective. How is the test done? A health professional uses a needle to take a blood sample, usually from the arm. What happens after the test?  · You will probably be able to go home right away. · You can go back to your usual activities right away. · It may take up to 2 weeks to get the results of your test. Your doctor will compare your current test result with the results of tests you had before. He or she will talk with you about your results and explain what they mean. Follow-up care is a key part of your treatment and safety. Be sure to make and go to all appointments, and call your doctor if you are having problems. Ask your doctor when you can expect to have your test results. Where can you learn more? Go to [email or web address] and sign in to your [employer] account. Enter V145 in the [facility] box to learn more about \"Hepatitis C Viral Load: About This Test.\"     If you do not have an account, please click on the \"Sign Up Now\" link. Current as of: September 23, 2020               Content Version: 12.9  © 2100-5608 [employer], [facility]. Care instructions adapted under license by [facility] ([facility]). If you have questions about a medical condition or this instruction, always ask your healthcare professional. [place] [address] any warranty or liability for your use of this information.

## 2021-08-19 NOTE — TELEPHONE ENCOUNTER
Called and spoke with pt to notify her of appt for US liver. Pt is scheduled on 08/31/2021 @ 10:45am in [facility]. Pt was instructed to fast for 6 hours prior to the scan, bring ID and insurance card, and arrive by 10:15am. Pt verbalized understanding.    Electronically signed by Chas Christina MA on 8/19/2021 at 1:04 PM

## 2021-08-19 NOTE — PROGRESS NOTES
History and Physical      ASSESSMENT AND PLAN:    36y/F w/ history of IVDU and possible endocarditis w/ previously diagnosed HCV presents to clinic today to seek treatment options for her HCV. She currently has basic lab work pending. Today I will obtain labs for HCV antibody as well as HCV viral load (PCR) with reflex genotyping. I will also obtain labs for Hepatitis A and B to ensure that there is no coinfection. The patient has an upcoming appointment with cardiology regarding her endocarditis. In the interim we will wait for her labs to return. I will also be obtaining a right upper quadrant ultrasound to ensure that there is no suggestion of significant underlying fibrosis. She has no signs or symptoms to suggest advanced underlying liver disease. After receiving the results of the above work-up, we will come up with a treatment strategy for which antiviral regimen will work best given her genotype. I told her in advance today that I will wait until completion of her endocarditis work-up and therapy before beginning antiviral therapy. HISTORY OF PRESENT ILLNESS:      Charan Madrid is a 59-year-old female with longstanding history of IVDU who presents to clinic today seeking treatment for HCV. She mentions that she was recently hospitalized at an outside facility where she was diagnosed with HCV as well as endocarditis. She received 6 days of IV antibiotics before leaving the hospital AMA so that she can smoke. She denies any signs or symptoms of underlying sepsis including fevers, chills, nausea, or vomiting. She denies any signs or symptoms of underlying hepatic decompensation including encephalopathy, jaundice, ascites, or easy bruising. She denies new rashes. She denies chest pain or shortness of breath. She is otherwise healthy. She recently had basic lab work drawn including CBC, CMP, hemoglobin A1c, TSH, and lipid panel. We are still awaiting these results.     Past Medical History:        Diagnosis Date    Back pain     Endocarditis     Hepatitis C     Substance abuse ([facility] [address]      Past Surgical History:        Procedure Laterality Date     SECTION      CYST REMOVAL      ovary     Social History:    TOBACCO:   reports that she has been smoking. She has been smoking about 1.00 pack per day. She has never used smokeless tobacco.  ETOH:   reports no history of alcohol use. DRUGS:   Drugs: IV and Opiates . Family History:       Problem Relation Age of Onset    Osteoporosis Mother     Substance Abuse Father     Cirrhosis Father          Current Outpatient Medications:     Buprenorphine HCl-Naloxone HCl (SUBOXONE SL), Place 8 mg under the tongue 2 times daily, Disp: , Rfl:     FLUoxetine (PROZAC) 10 MG capsule, Take 1 capsule by mouth daily, Disp: 30 capsule, Rfl: 3    ibuprofen (IBU) 800 MG tablet, Take 1 tablet by mouth every 8 hours as needed for Pain, Disp: 21 tablet, Rfl: 0     Allergies:  Patient has no known allergies. ROS:  General: Patient denies n/v/f/c or weight loss. HEENT: Patient denies persistent postnasal drip, scleral icterus, drooling, persistent bleeding from nose/mouth. Resp: Patient denies SOB, wheezing, productive cough. Cards: Patient denies CP, palpitations, significant edema  GI: Patient denies abdominal pain, altered bowel habits, or bloody bowel movements. Derm: Patient denies jaundice/rashes. Musc: Patient denies diffuse/irregular joint swelling or myalgias. PHYSICAL EXAM:  /68 (Site: Right Upper Arm, Position: Sitting, Cuff Size: Medium Adult)   Pulse 65   Temp 98.2 °F (36.8 °C) (Oral)   Resp 18   Ht 5' 8\" (1.727 m)   Wt 161 lb 11.2 oz (73.3 kg)   SpO2 95%   BMI 24.59 kg/m²   General: Overall well-appearing, NAD  HEENT: PERRLA, EOMI, Anicteric sclera, MMM. No asterixis. Cards: RRR, no murmur  Resp: CTAB, no wheezing, good air movement  Abdomen: soft, NT, ND, no ascites.    Extremities: No edema or joint effusions, adequate gait  Skin: No rashes or jaundice  Neuro: A&O x 3, CN grossly intact, non-focal exam.              Electronically signed by Tonio Valiente MD on 8/19/2021 at 1:02 PM

## 2021-08-19 NOTE — TELEPHONE ENCOUNTER
Called and spoke with radiology central to schedule pt for US liver in [facility]. Pt is scheduled on 08/31/2021 @ 10:45am arrival time of 10:15am in [facility].    Electronically signed by Juan José Sandoval MA on 8/19/2021 at 1:02 PM

## 2021-08-27 DIAGNOSIS — Z00.00 ROUTINE HEALTH MAINTENANCE: ICD-10-CM

## 2021-08-31 ENCOUNTER — OFFICE VISIT (OUTPATIENT)
Dept: CARDIOLOGY CLINIC | Age: 36
End: 2021-08-31
Payer: MEDICARE

## 2021-08-31 VITALS
SYSTOLIC BLOOD PRESSURE: 112 MMHG | BODY MASS INDEX: 23.82 KG/M2 | DIASTOLIC BLOOD PRESSURE: 60 MMHG | OXYGEN SATURATION: 96 % | WEIGHT: 160.8 LBS | HEART RATE: 60 BPM | HEIGHT: 69 IN | RESPIRATION RATE: 16 BRPM

## 2021-08-31 DIAGNOSIS — R06.09 DOE (DYSPNEA ON EXERTION): Primary | ICD-10-CM

## 2021-08-31 PROCEDURE — G8420 CALC BMI NORM PARAMETERS: HCPCS | Performed by: INTERNAL MEDICINE

## 2021-08-31 PROCEDURE — 99204 OFFICE O/P NEW MOD 45 MIN: CPT | Performed by: INTERNAL MEDICINE

## 2021-08-31 PROCEDURE — G8427 DOCREV CUR MEDS BY ELIG CLIN: HCPCS | Performed by: INTERNAL MEDICINE

## 2021-08-31 PROCEDURE — 4004F PT TOBACCO SCREEN RCVD TLK: CPT | Performed by: INTERNAL MEDICINE

## 2021-08-31 PROCEDURE — 93000 ELECTROCARDIOGRAM COMPLETE: CPT | Performed by: INTERNAL MEDICINE

## 2021-08-31 NOTE — PROGRESS NOTES
[address]   [facility] [place]   Clinic Note     Date:21   Patient Name:Batsheva Durham  YOB: 1985  Age: 39 y.o. Primary Care Provider: Karli Jacobson DO    Subjective     65-year-old  female referred for concern about endocarditis. She had a 14-year history of intravenous drug abuse including methamphetamine, heroin. She says she has been clean for about 7 months now. She was hospitalized in 2020 at [facility] for an arm abscess. The notes do not mention evidence of bacteremia but the patient tells me that they have not been able to drawl blood cultures on her as she is a hard stick. Symptomatically she has a constellation of nonspecific symptoms such as intermittent bilateral hand swelling, joint pains, numbness and tingling of the left arm, intermittent chest tightness. The symptoms are brief and nonreproducible. A focused history review includes:  1. Hepatitis C-being worked up by GI  2. History of multisubstance abuse including cocaine and intravenous opiate abuse. 7 months. suboxone maintenance. 3. Trying to quit smoking  4. No EtOH      Past History    Past Medical History:         Diagnosis Date    Back pain     Endocarditis     Hepatitis C     Substance abuse ([facility] [address]        Past Surgical History:  Past Surgical History:   Procedure Laterality Date     SECTION      x2    CYST REMOVAL      ovary       Social History:    Social History     Tobacco History     Smoking Status  Current Every Day Smoker Smoking Frequency  1 pack/day    Smokeless Tobacco Use  Never Used          Alcohol History     Alcohol Use Status  No          Drug Use     Drug Use Status  Not Asked Comment  heroin oxy.  last used heroin about 8 hrs ago or less           Sexual Activity     Sexually Active  Not Asked                    Family History:-      Problem Relation Age of Onset    Osteoporosis Mother     Substance Abuse Father     Cirrhosis Father     Heart Attack Father     Other Sister         hep c         Review of Systems   Negative except as in HPI        Medications     Current Outpatient Medications   Medication Sig Dispense Refill    Buprenorphine HCl-Naloxone HCl (SUBOXONE SL) Place 8 mg under the tongue 2 times daily      FLUoxetine (PROZAC) 10 MG capsule Take 1 capsule by mouth daily 30 capsule 3     No current facility-administered medications for this visit. Physical Examination      /60 (Site: Left Upper Arm, Position: Sitting, Cuff Size: Medium Adult)   Pulse 60   Resp 16   Ht 5' 8.5\" (1.74 m)   Wt 160 lb 12.8 oz (72.9 kg)   LMP 08/18/2021 (Exact Date)   SpO2 96%   BMI 24.09 kg/m²   Body surface area is 1.88 meters squared. General: No acute distress, appears as stated age, nonicteric  Head: Atraumatic, no gross abnormalities or bruises  Neck: Supple and nontender, no carotid bruits, no JVP  Lungs: Clear to auscultation bilaterally, no wheezes, rales, or rhonchi  Heart: Regular rate and rhythm, no murmurs, rubs, or gallops  Abdomen: Soft, nontender, nondistended, normal bowel sounds  Extremities: No obvious deformities, no cyanosis, no edema  Neurological: Alert and oriented x3, EOMI, moving all extremities x4  Psychological: Normal mood and affect, cooperative  Skin: Color, texture, and turgor normal for age          Labs/Imaging/Diagnostics   Personally reviewed:    Lab Results   Component Value Date     02/07/2021    K 4.1 02/07/2021     02/07/2021    CO2 26 02/07/2021    BUN 10 02/07/2021    CREATININE 0.8 02/07/2021    GLUCOSE 99 02/07/2021    GLUCOSE 80 04/24/2011    CALCIUM 8.7 02/07/2021        CrCl cannot be calculated (Patient's most recent lab result is older than the maximum 120 days allowed. ).     Lab Results   Component Value Date    WBC 9.1 02/07/2021    HGB 13.6 02/07/2021    HCT 43.3 02/07/2021    MCV 88.0 02/07/2021     02/07/2021       Lab Results   Component Value Date    ALT 66 (H) 02/07/2021    AST 44 (H) 02/07/2021    ALKPHOS 84 02/07/2021    BILITOT 0.4 02/07/2021       Lab Results   Component Value Date    LABALBU 4.0 02/07/2021       Lab Results   Component Value Date    CHOL 166 11/24/2018     Lab Results   Component Value Date    TRIG 135 11/24/2018     Lab Results   Component Value Date    HDL 47 11/24/2018     Lab Results   Component Value Date    LDLCALC 92 11/24/2018     Lab Results   Component Value Date    LABVLDL 27 11/24/2018         Lab Results   Component Value Date    LABA1C 5.4 11/24/2018         Personally interpreted the following studies.:  EKG: Normal sinus rhythm. Normal     Assessment and Plan:        48-year-old  female with long history of IV drug abuse and nonspecific cardiac complaints including dyspnea on exertion and \"chest tightness\". Given her high risk for endocarditis I think it is reasonable to pursue a TTE with the realization that if it is abnormal a JORGE LUIS will be indicated and if not her pretest likelihood makes it safe to forego JORGE LUIS. Additionally I will risk stratify her dyspnea on exertion with a treadmill stress EKG. Diagnosis Orders   1. MANE (dyspnea on exertion)  EKG 12 Lead    ECHO COMPLETE    CARDIAC STRESS TEST EXERCISE ONLY         Assessment and plan reviewed with the patient/family and their questions and concerns answered in full.  Follow up with me in 12 months    We appreciate the opportunity to participate in Her care!       Terry Wilder MD, [facility]  Interventional Cardiology/Structural Heart Disease  Office: [phone]  Fax: [phone]  Office Coordinators: Hafsa Self

## 2021-09-08 ENCOUNTER — OFFICE VISIT (OUTPATIENT)
Dept: FAMILY MEDICINE CLINIC | Age: 36
End: 2021-09-08
Payer: MEDICARE

## 2021-09-08 VITALS
HEIGHT: 69 IN | DIASTOLIC BLOOD PRESSURE: 64 MMHG | RESPIRATION RATE: 16 BRPM | WEIGHT: 158.6 LBS | BODY MASS INDEX: 23.49 KG/M2 | TEMPERATURE: 98.7 F | HEART RATE: 58 BPM | OXYGEN SATURATION: 96 % | SYSTOLIC BLOOD PRESSURE: 106 MMHG

## 2021-09-08 DIAGNOSIS — F41.9 ANXIETY: ICD-10-CM

## 2021-09-08 DIAGNOSIS — F33.2 SEVERE EPISODE OF RECURRENT MAJOR DEPRESSIVE DISORDER, WITHOUT PSYCHOTIC FEATURES (HCC): Primary | ICD-10-CM

## 2021-09-08 DIAGNOSIS — G43.709 CHRONIC MIGRAINE WITHOUT AURA WITHOUT STATUS MIGRAINOSUS, NOT INTRACTABLE: ICD-10-CM

## 2021-09-08 PROCEDURE — G8420 CALC BMI NORM PARAMETERS: HCPCS | Performed by: STUDENT IN AN ORGANIZED HEALTH CARE EDUCATION/TRAINING PROGRAM

## 2021-09-08 PROCEDURE — 99213 OFFICE O/P EST LOW 20 MIN: CPT | Performed by: STUDENT IN AN ORGANIZED HEALTH CARE EDUCATION/TRAINING PROGRAM

## 2021-09-08 PROCEDURE — 4004F PT TOBACCO SCREEN RCVD TLK: CPT | Performed by: STUDENT IN AN ORGANIZED HEALTH CARE EDUCATION/TRAINING PROGRAM

## 2021-09-08 PROCEDURE — G8427 DOCREV CUR MEDS BY ELIG CLIN: HCPCS | Performed by: STUDENT IN AN ORGANIZED HEALTH CARE EDUCATION/TRAINING PROGRAM

## 2021-09-08 RX ORDER — HYDROXYZINE HYDROCHLORIDE 25 MG/1
25 TABLET, FILM COATED ORAL NIGHTLY
Qty: 30 TABLET | Refills: 1 | Status: SHIPPED | OUTPATIENT
Start: 2021-09-08 | End: 2021-10-08

## 2021-09-08 RX ORDER — FLUOXETINE HYDROCHLORIDE 20 MG/1
20 CAPSULE ORAL DAILY
Qty: 90 CAPSULE | Refills: 1 | Status: SHIPPED | OUTPATIENT
Start: 2021-09-08

## 2021-09-08 RX ORDER — SUMATRIPTAN 50 MG/1
50 TABLET, FILM COATED ORAL
Qty: 9 TABLET | Refills: 5 | Status: SHIPPED
Start: 2021-09-08 | End: 2022-02-02 | Stop reason: SDUPTHER

## 2021-09-08 RX ORDER — TOPIRAMATE 25 MG/1
25 TABLET ORAL NIGHTLY
Qty: 60 TABLET | Refills: 3 | Status: SHIPPED
Start: 2021-09-08 | End: 2022-02-02 | Stop reason: SDUPTHER

## 2021-09-08 ASSESSMENT — ENCOUNTER SYMPTOMS
SHORTNESS OF BREATH: 0
SINUS PAIN: 0
ABDOMINAL PAIN: 0
SORE THROAT: 0
SINUS PRESSURE: 0
VOMITING: 1
COUGH: 0
DIARRHEA: 0
PHOTOPHOBIA: 1
BACK PAIN: 0
NAUSEA: 1
EYE PAIN: 0
RHINORRHEA: 0
EYE REDNESS: 0
CONSTIPATION: 0

## 2021-09-08 NOTE — PROGRESS NOTES
2021    Adele Paris (:  1985) is a 39 y.o. female, here for evaluation of the following medical concerns:    HPI  Chief Complaint   Patient presents with    Anxiety     Anxiety and/or depression:  Patient is here with complaints of anxiety and/or depression. This is a/an chronic problem. This has been going on for years. Treatment in the past includes prozac risperdal depakote klonopin valium. Patient does not have suicidal or homicidal ideation. Patient is  having issues falling or staying asleep. Patient is not having associated panic attacks. The above is  interfering with quality of life. Patient has seen a Counselor before. Patient does not use alcohol and/or drugs. She is on suboxone for heroin. prozac is helping a little but thinks it could help more. We will increase the dose    Patient's past medical, surgical, social and/or family history reviewed, updated in chart, and are non-contributory (unless otherwise stated). Medications and allergies also reviewed and updated in chart. Headache  Patient presents for evaluation of headache. Symptoms began about several years ago. Generally, the headaches last about several hours and occur 3-4 times per week. The headaches are usually worse first thing in the morning. The headaches are usually pounding and are located in the front of her head and wrap around her ears and to the back of her neck. The patient rates her most severe headaches a 10 on a scale from 1 to 10. Recently, the headaches have been increasing in both severity and frequency. Work attendance or other daily activities are affected by the headaches. Precipitating factors include: light. The headaches are usually not preceded by an aura. Associated neurologic symptoms: decreased physical activity, vomiting in the early morning and worsening school/work performance. The patient denies dizziness, loss of balance and muscle weakness.  Home treatment has included darkening the room, resting, sleeping and has tried presciption medicine but does not remember the name with no improvement. Other history includes: migraine headaches diagnosed in the past. Family history includes migraine headaches in mother and son. Review of Systems   Constitutional: Negative for chills, fatigue and fever. HENT: Negative for congestion, ear pain, postnasal drip, rhinorrhea, sinus pressure, sinus pain and sore throat. Eyes: Positive for photophobia. Negative for pain and redness. Respiratory: Negative for cough and shortness of breath. Cardiovascular: Negative for chest pain. Gastrointestinal: Positive for nausea and vomiting. Negative for abdominal pain, constipation and diarrhea. Genitourinary: Negative for difficulty urinating and dysuria. Musculoskeletal: Negative for back pain, myalgias and neck pain. Skin: Negative for rash. Neurological: Positive for headaches. Negative for dizziness, light-headedness and numbness. Psychiatric/Behavioral: Positive for dysphoric mood and sleep disturbance. Negative for suicidal ideas. The patient is nervous/anxious. Prior to Visit Medications    Medication Sig Taking?  Authorizing Provider   FLUoxetine (PROZAC) 20 MG capsule Take 1 capsule by mouth daily Yes Anamika Li DO   hydrOXYzine (ATARAX) 25 MG tablet Take 1 tablet by mouth nightly Yes Anamika Li DO   topiramate (TOPAMAX) 25 MG tablet Take 1 tablet by mouth nightly Yes Anamika Li DO   SUMAtriptan (IMITREX) 50 MG tablet Take 1 tablet by mouth once as needed for Migraine Yes Anamika Li DO   Buprenorphine HCl-Naloxone HCl (SUBOXONE SL) Place 8 mg under the tongue 2 times daily Yes Historical Provider, MD        No Known Allergies    Past Medical History:   Diagnosis Date    Back pain     Endocarditis     Hepatitis C     Substance abuse ([facility] [address]        Past Surgical History:   Procedure Laterality Date     SECTION      x2    CYST REMOVAL ovary       Social History     Socioeconomic History    Marital status: Legally      Spouse name: Not on file    Number of children: Not on file    Years of education: Not on file    Highest education level: Not on file   Occupational History    Not on file   Tobacco Use    Smoking status: Current Every Day Smoker     Packs/day: 2.00     Years: 24.00     Pack years: 48.00    Smokeless tobacco: Never Used    Tobacco comment: currently 0.5 ppd, 8/31/21   Vaping Use    Vaping Use: Never used   Substance and Sexual Activity    Alcohol use: Not Currently    Drug use: Not Currently     Types: IV, Opiates      Comment: heroin,  oxy. last used heroin about 8 hrs ago or less ; on suboxone    Sexual activity: Not on file   Other Topics Concern    Not on file   Social History Narrative    Drinks 3 Mt Dew daily     Social Determinants of Health     Financial Resource Strain: Low Risk     Difficulty of Paying Living Expenses: Not hard at all   Food Insecurity: No Food Insecurity    Worried About Running Out of Food in the Last Year: Never true    Susy of Food in the Last Year: Never true   Transportation Needs:     Lack of Transportation (Medical):      Lack of Transportation (Non-Medical):    Physical Activity:     Days of Exercise per Week:     Minutes of Exercise per Session:    Stress:     Feeling of Stress :    Social Connections:     Frequency of Communication with Friends and Family:     Frequency of Social Gatherings with Friends and Family:     Attends [employer] Services:     Active Member of Clubs or Organizations:     Attends Club or Organization Meetings:     Marital Status:    Intimate Partner Violence:     Fear of Current or Ex-Partner:     Emotionally Abused:     Physically Abused:     Sexually Abused:         Family History   Problem Relation Age of Onset    Osteoporosis Mother     Substance Abuse Father     Cirrhosis Father     Heart Attack Father     Other Sister hep c           Vitals:    09/08/21 1059   BP: 106/64   Pulse: 58   Resp: 16   Temp: 98.7 °F (37.1 °C)   TempSrc: Temporal   SpO2: 96%   Weight: 158 lb 9.6 oz (71.9 kg)   Height: 5' 8.5\" (1.74 m)     Estimated body mass index is 23.76 kg/m² as calculated from the following:    Height as of this encounter: 5' 8.5\" (1.74 m). Weight as of this encounter: 158 lb 9.6 oz (71.9 kg).     Most Recent Labs  CBC  Lab Results   Component Value Date    WBC 9.1 02/07/2021    WBC 10.7 11/24/2018    WBC 11.9 10/28/2013    RBC 4.92 02/07/2021    RBC 5.62 11/24/2018    RBC 5.59 10/28/2013    HGB 13.6 02/07/2021    HGB 15.3 11/24/2018    HGB 15.4 10/28/2013    HCT 43.3 02/07/2021    HCT 47.5 11/24/2018    HCT 47.2 10/28/2013    MCV 88.0 02/07/2021    MCV 84.5 11/24/2018    MCV 84.4 10/28/2013     02/07/2021     11/24/2018     10/28/2013      CMP  Lab Results   Component Value Date     02/07/2021     11/24/2018     10/28/2013    K 4.1 02/07/2021    K 4.2 02/07/2021    K 3.9 11/24/2018     02/07/2021     11/24/2018     10/28/2013    CO2 26 02/07/2021    CO2 25 11/24/2018    CO2 30 10/28/2013    ANIONGAP 9 02/07/2021    ANIONGAP 14 11/24/2018    GLUCOSE 99 02/07/2021    GLUCOSE 72 02/06/2021    GLUCOSE 91 11/24/2018    GLUCOSE 80 04/24/2011    BUN 10 02/07/2021    BUN 16 11/24/2018    BUN 10 10/28/2013    CREATININE 0.8 02/07/2021    CREATININE 1.0 11/24/2018    CREATININE 0.9 10/28/2013    LABGLOM >60 02/07/2021    LABGLOM >60 11/24/2018    LABGLOM >60 10/28/2013    LABGLOM >60 04/24/2011    GFRAA >60 02/07/2021    GFRAA >60 11/24/2018    CALCIUM 8.7 02/07/2021    CALCIUM 9.6 11/24/2018    CALCIUM 9.4 10/28/2013    PROT 7.3 02/07/2021    PROT 7.9 11/24/2018    PROT 8.2 10/28/2013    LABALBU 4.0 02/07/2021    LABALBU 4.4 11/24/2018    LABALBU 4.6 10/28/2013    LABALBU 3.1 04/24/2011    BILITOT 0.4 02/07/2021    BILITOT 0.2 11/24/2018    BILITOT 0.5 10/28/2013    ALKPHOS 84 02/07/2021    ALKPHOS 76 11/24/2018    ALKPHOS 74 10/28/2013    AST 44 02/07/2021    AST 7 11/24/2018    AST 11 10/28/2013    ALT 66 02/07/2021    ALT 6 11/24/2018    ALT 6 10/28/2013     A1C  Lab Results   Component Value Date    LABA1C 5.4 11/24/2018     TSH  Lab Results   Component Value Date    TSH 0.580 10/28/2013     FREET4  Lab Results   Component Value Date    D8XTWUT 5.5 10/28/2013     LIPID  Lab Results   Component Value Date    CHOL 166 11/24/2018    HDL 47 11/24/2018    LDLCALC 92 11/24/2018    TRIG 135 11/24/2018     UA  Lab Results   Component Value Date    COLORU Yellow 02/06/2020    COLORU YELLOW 10/28/2013    CLARITYU Clear 02/06/2020    CLARITYU TURBID 10/28/2013    GLUCOSEU Negative 02/06/2020    GLUCOSEU NEGATIVE 10/28/2013    BILIRUBINUR Negative 02/06/2020    BILIRUBINUR NEGATIVE 10/28/2013    KETUA Negative 02/06/2020    KETUA NEGATIVE 10/28/2013    SPECGRAV 1.025 02/06/2020    SPECGRAV 1.025 10/28/2013    BLOODU Negative 02/06/2020    BLOODU LARGE 10/28/2013    PHUR 7.0 02/06/2020    PHUR 6.0 10/28/2013    PROTEINU Negative 02/06/2020    PROTEINU NEGATIVE 10/28/2013    UROBILINOGEN 0.2 02/06/2020    UROBILINOGEN 0.2 10/28/2013    NITRU POSITIVE 02/06/2020    NITRU POSITIVE 10/28/2013    LEUKOCYTESUR Negative 02/06/2020    LEUKOCYTESUR SMALL 10/28/2013       Physical Exam  Vitals and nursing note reviewed. Constitutional:       Appearance: Normal appearance. HENT:      Head: Normocephalic and atraumatic. Right Ear: External ear normal.      Left Ear: External ear normal.   Eyes:      Extraocular Movements: Extraocular movements intact. Conjunctiva/sclera: Conjunctivae normal.      Pupils: Pupils are equal, round, and reactive to light. Cardiovascular:      Rate and Rhythm: Normal rate and regular rhythm. Pulses: Normal pulses. Heart sounds: Normal heart sounds. Pulmonary:      Effort: Pulmonary effort is normal.      Breath sounds: Normal breath sounds.    Abdominal: General: Bowel sounds are normal.      Palpations: Abdomen is soft. Musculoskeletal:         General: Normal range of motion. Cervical back: Normal range of motion and neck supple. Skin:     General: Skin is warm and dry. Capillary Refill: Capillary refill takes less than 2 seconds. Neurological:      General: No focal deficit present. Mental Status: She is alert and oriented to person, place, and time. Psychiatric:         Mood and Affect: Mood normal.         Behavior: Behavior normal.         Thought Content: Thought content normal.         ASSESSMENT/PLAN:  Juan Daigle was seen today for anxiety. Diagnoses and all orders for this visit:    Severe episode of recurrent major depressive disorder, without psychotic features ([facility] [address]  -     FLUoxetine (PROZAC) 20 MG capsule; Take 1 capsule by mouth daily    Anxiety  -     hydrOXYzine (ATARAX) 25 MG tablet; Take 1 tablet by mouth nightly    Chronic migraine without aura without status migrainosus, not intractable  -     topiramate (TOPAMAX) 25 MG tablet; Take 1 tablet by mouth nightly  -     SUMAtriptan (IMITREX) 50 MG tablet; Take 1 tablet by mouth once as needed for Migraine       Take 1 topamax for a week then twice a day; imitrex as needed for breakthrough headaches  Increase prozac to 20 mg  Start hydroxyzine at night    Educational materials and/or home exercises printed for patient's review and were included in patient instructions on his/her After Visit Summary and given to patient at the end of visit. Counseled regarding above diagnosis, including possible risks and complications,  especially if left uncontrolled. Counseled regarding the possible side effects, risks, benefits and alternatives to treatment; patient and/or guardian verbalizes understanding, agrees, feels comfortable with and wishes to proceed with above treatment plan.      Advised patient to call with any new medication issues, and read all Rx info from pharmacy to assure aware of all possible risks and side effects of medication before taking. Reviewed age and gender appropriate health screening exams and vaccinations. Advised patient regarding importance of keeping up with recommended health maintenance and to schedule as soon as possible if overdue, as this is important in assessing for undiagnosed pathology, especially cancer, as well as protecting against potentially harmful/life threatening disease. Patient and/or guardian verbalizes understanding and agrees with above counseling, assessment and plan. All questions answered. Return in about 4 weeks (around 10/6/2021). An  electronic signature was used to authenticate this note.     --Melani Smith,  on 9/8/2021 at 11:36 AM

## 2021-11-05 ENCOUNTER — HOSPITAL ENCOUNTER (OUTPATIENT)
Dept: CARDIOLOGY | Age: 36
Discharge: HOME | End: 2021-11-05
Payer: MEDICARE

## 2021-11-05 DIAGNOSIS — R06.09 DOE (DYSPNEA ON EXERTION): ICD-10-CM

## 2021-11-05 LAB
LV EF: 60 %
LVEF MODALITY: NORMAL

## 2021-11-05 PROCEDURE — 93306 TTE W/DOPPLER COMPLETE: CPT

## 2021-11-08 ENCOUNTER — TELEPHONE (OUTPATIENT)
Dept: CARDIOLOGY CLINIC | Age: 36
End: 2021-11-08

## 2021-11-08 NOTE — TELEPHONE ENCOUNTER
----- Message from Asia Price MD sent at 11/6/2021  3:08 PM EDT -----  Will you let her know her echocardiogram was essentially normal.  Thanks

## 2021-12-03 ENCOUNTER — TELEPHONE (OUTPATIENT)
Dept: CARDIOLOGY | Age: 36
End: 2021-12-03

## 2021-12-03 NOTE — TELEPHONE ENCOUNTER
Stress ordered in Aug 2021 was contingent on results of Echo. Per Dr notes, echo was essentially normal. Just making that sure that stress will not need to be scheduled.  Thank you   Electronically signed by Mila Adan on 12/3/2021 at 2:20 PM

## 2021-12-07 ENCOUNTER — TELEPHONE (OUTPATIENT)
Dept: CARDIOLOGY | Age: 36
End: 2021-12-07

## 2021-12-15 ENCOUNTER — TELEPHONE (OUTPATIENT)
Dept: CARDIOLOGY | Age: 36
End: 2021-12-15

## 2021-12-15 NOTE — TELEPHONE ENCOUNTER
12-15-21 @ 8633. Called and spoke with the patient with a reminder regarding her  stress test on 12-17-21 @ 8715. Reviewed instructions and Covid checklist. Patient voiced understanding to all instructions.   Kelsey Gonsalves RN  [facility] [place] [facility]

## 2021-12-27 ENCOUNTER — NURSE TRIAGE (OUTPATIENT)
Dept: OTHER | Facility: CLINIC | Age: 36
End: 2021-12-27

## 2021-12-27 NOTE — TELEPHONE ENCOUNTER
Received call from Priya Vázquez at [facility], caller not on line. Complaint: Per Mariama pt stated \"Worst migraine of her life - she has been getting migraines for about a month, but this one has lasted three days\"    Market: [employer] Name: [employer] telephone number verified as [phone]    Connected with caller via phone, please see below triage    Subjective: Caller states \"I'm having horrible migraines; I've tried Imitrex and it is not working. \"     Current Symptoms: Pt has been throwing up - The pain in her head goes above head to the back of my neck    Onset: 3 days ago; worsening    Associated Symptoms: reduced activity    Pain Severity: 7/10; pounding; constant    Temperature: Denies fever    What has been tried: Pt has been taking Imitrex with very slight improvement     LMP: 11/2021 Pregnant: No    Recommended disposition: Office with PCP approval - transferred pt to Priya Vázquez at Dr. Fredy Hinton office for further instruction. Care advice provided, patient verbalizes understanding; denies any other questions or concerns; instructed to call back for any new or worsening symptoms. Provided warm transfer to Mariama at Dr. Fredy Hinton office     Attention Provider: Thank you for allowing me to participate in the care of your patient. The patient was connected to triage in response to information provided to the ECC/PSC. Please do not respond through this encounter as the response is not directed to a shared pool.       Reason for Disposition   SEVERE headache, states 'worst headache' of life    Protocols used: HEADACHE-ADULT-[place]

## 2022-01-26 ENCOUNTER — TELEPHONE (OUTPATIENT)
Dept: CARDIOLOGY | Age: 37
End: 2022-01-26

## 2022-01-26 NOTE — TELEPHONE ENCOUNTER
Attempt to reach the patient was unsuccessful. Left a VM reminding her of her 1:00pm stress test at [address] [place] [address]. Cardiology and where to report. She was instructed that she may have a light breakfast and that she can take her medications but to avoid caffeine products for 12 hours prior. Covid protocol was reviewed. Phone number was provided to return call with any questions.

## 2022-01-28 ENCOUNTER — TELEPHONE (OUTPATIENT)
Dept: CARDIOLOGY | Age: 37
End: 2022-01-28

## 2022-02-02 ENCOUNTER — OFFICE VISIT (OUTPATIENT)
Dept: FAMILY MEDICINE CLINIC | Age: 37
End: 2022-02-02
Payer: MEDICARE

## 2022-02-02 VITALS
RESPIRATION RATE: 18 BRPM | TEMPERATURE: 98.6 F | HEART RATE: 64 BPM | SYSTOLIC BLOOD PRESSURE: 98 MMHG | DIASTOLIC BLOOD PRESSURE: 60 MMHG | BODY MASS INDEX: 22.36 KG/M2 | OXYGEN SATURATION: 97 % | HEIGHT: 69 IN | WEIGHT: 151 LBS

## 2022-02-02 DIAGNOSIS — Z28.21 VACCINATION NOT CARRIED OUT BECAUSE OF PATIENT REFUSAL: ICD-10-CM

## 2022-02-02 DIAGNOSIS — B19.20 HEPATITIS C VIRUS INFECTION WITHOUT HEPATIC COMA, UNSPECIFIED CHRONICITY: Primary | ICD-10-CM

## 2022-02-02 DIAGNOSIS — Z23 FLU VACCINE NEED: ICD-10-CM

## 2022-02-02 DIAGNOSIS — G43.709 CHRONIC MIGRAINE WITHOUT AURA WITHOUT STATUS MIGRAINOSUS, NOT INTRACTABLE: ICD-10-CM

## 2022-02-02 PROCEDURE — 4004F PT TOBACCO SCREEN RCVD TLK: CPT | Performed by: STUDENT IN AN ORGANIZED HEALTH CARE EDUCATION/TRAINING PROGRAM

## 2022-02-02 PROCEDURE — G8427 DOCREV CUR MEDS BY ELIG CLIN: HCPCS | Performed by: STUDENT IN AN ORGANIZED HEALTH CARE EDUCATION/TRAINING PROGRAM

## 2022-02-02 PROCEDURE — 90471 IMMUNIZATION ADMIN: CPT | Performed by: STUDENT IN AN ORGANIZED HEALTH CARE EDUCATION/TRAINING PROGRAM

## 2022-02-02 PROCEDURE — G8482 FLU IMMUNIZE ORDER/ADMIN: HCPCS | Performed by: STUDENT IN AN ORGANIZED HEALTH CARE EDUCATION/TRAINING PROGRAM

## 2022-02-02 PROCEDURE — 90674 CCIIV4 VAC NO PRSV 0.5 ML IM: CPT | Performed by: STUDENT IN AN ORGANIZED HEALTH CARE EDUCATION/TRAINING PROGRAM

## 2022-02-02 PROCEDURE — 99213 OFFICE O/P EST LOW 20 MIN: CPT | Performed by: STUDENT IN AN ORGANIZED HEALTH CARE EDUCATION/TRAINING PROGRAM

## 2022-02-02 PROCEDURE — G8420 CALC BMI NORM PARAMETERS: HCPCS | Performed by: STUDENT IN AN ORGANIZED HEALTH CARE EDUCATION/TRAINING PROGRAM

## 2022-02-02 RX ORDER — SUMATRIPTAN 50 MG/1
50 TABLET, FILM COATED ORAL
Qty: 9 TABLET | Refills: 5 | Status: SHIPPED | OUTPATIENT
Start: 2022-02-02 | End: 2022-02-02

## 2022-02-02 RX ORDER — TOPIRAMATE 25 MG/1
25 TABLET ORAL NIGHTLY
Qty: 60 TABLET | Refills: 3 | Status: SHIPPED | OUTPATIENT
Start: 2022-02-02

## 2022-02-02 ASSESSMENT — ENCOUNTER SYMPTOMS
SINUS PRESSURE: 0
CONSTIPATION: 0
ABDOMINAL PAIN: 0
BACK PAIN: 0
COUGH: 0
NAUSEA: 0
SHORTNESS OF BREATH: 0
EYE REDNESS: 0
SORE THROAT: 0
VOMITING: 0
SINUS PAIN: 0
EYE PAIN: 0
DIARRHEA: 0
RHINORRHEA: 0

## 2022-02-02 ASSESSMENT — PATIENT HEALTH QUESTIONNAIRE - PHQ9
SUM OF ALL RESPONSES TO PHQ QUESTIONS 1-9: 0
1. LITTLE INTEREST OR PLEASURE IN DOING THINGS: 0
2. FEELING DOWN, DEPRESSED OR HOPELESS: 0
SUM OF ALL RESPONSES TO PHQ9 QUESTIONS 1 & 2: 0

## 2022-02-02 ASSESSMENT — LIFESTYLE VARIABLES: HOW OFTEN DO YOU HAVE A DRINK CONTAINING ALCOHOL: NEVER

## 2022-02-02 NOTE — PROGRESS NOTES
2/2/2022    [facility]  Chief Complaint   Patient presents with    Referral - General     needs a new referral to [address] [place] [address] Gastroenterology        Papito Jackson is a 44 y.o. female who  has a past medical history of Back pain, Endocarditis, Hepatitis C, and Substance abuse ([facility] [address] Patient is a 67-year-old female here today for a referral for GI. She wants to get appointment gastroenterology. She states that Dr. Sandra Siddiqi was too far away for her and warm. She states she also missed an appointment and had to reschedule due to her kids school being closed due to Covid. She states that they also never called her back. We will put a new referral in and see if we need to do anything else. She just needs her hepatitis C treated. Patient would like a flu shot today. Patient states she has quit smoking but is still vaping. Review of Systems   Constitutional: Negative for chills, fatigue and fever. HENT: Negative for congestion, ear pain, postnasal drip, rhinorrhea, sinus pressure, sinus pain and sore throat. Eyes: Negative for pain and redness. Respiratory: Negative for cough and shortness of breath. Cardiovascular: Negative for chest pain. Gastrointestinal: Negative for abdominal pain, constipation, diarrhea, nausea and vomiting. Genitourinary: Negative for difficulty urinating and dysuria. Musculoskeletal: Negative for back pain, myalgias and neck pain. Skin: Negative for rash. Neurological: Positive for headaches. Negative for dizziness, light-headedness and numbness. Psychiatric/Behavioral: The patient is not nervous/anxious. Prior to Visit Medications    Medication Sig Taking?  Authorizing Provider   topiramate (TOPAMAX) 25 MG tablet Take 1 tablet by mouth nightly Yes Anamika Li DO   SUMAtriptan (IMITREX) 50 MG tablet Take 1 tablet by mouth once as needed for Migraine Yes Anamika Li DO   FLUoxetine (PROZAC) 20 MG capsule Take 1 capsule by mouth daily Yes Anamika Jen, DO   Buprenorphine HCl-Naloxone HCl (SUBOXONE SL) Place 8 mg under the tongue 2 times daily Yes Historical Provider, MD        No Known Allergies    Past Medical History:   Diagnosis Date    Back pain     Endocarditis     Hepatitis C     Substance abuse ([facility] [address]        Past Surgical History:   Procedure Laterality Date     SECTION      x2    CYST REMOVAL      ovary       Social History     Socioeconomic History    Marital status: Legally      Spouse name: Not on file    Number of children: Not on file    Years of education: Not on file    Highest education level: Not on file   Occupational History    Not on file   Tobacco Use    Smoking status: Current Every Day Smoker     Packs/day: 2.00     Years: 24.00     Pack years: 48.00     Types: Cigarettes    Smokeless tobacco: Never Used    Tobacco comment: currently not smoking cigarettes but is vaping   Vaping Use    Vaping Use: Every day    Substances: Nicotine, Flavoring, 5 %    Devices: Pre-filled or refillable cartridge   Substance and Sexual Activity    Alcohol use: Not Currently    Drug use: Not Currently     Types: IV, Opiates      Comment: heroin,  oxy. last used heroin about 8 hrs ago or less ; on suboxone    Sexual activity: Not on file   Other Topics Concern    Not on file   Social History Narrative    Drinks 3 Mt Dew daily     Social Determinants of Health     Financial Resource Strain: Low Risk     Difficulty of Paying Living Expenses: Not hard at all   Food Insecurity: No Food Insecurity    Worried About Running Out of Food in the Last Year: Never true    Susy of Food in the Last Year: Never true   Transportation Needs:     Lack of Transportation (Medical): Not on file    Lack of Transportation (Non-Medical):  Not on file   Physical Activity:     Days of Exercise per Week: Not on file    Minutes of Exercise per Session: Not on file   Stress:     Feeling of Stress : Not on file   Social Connections:     Frequency of Communication with Friends and Family: Not on file    Frequency of Social Gatherings with Friends and Family: Not on file    Attends Synagogue Services: Not on file    Active Member of Clubs or Organizations: Not on file    Attends Club or Organization Meetings: Not on file    Marital Status: Not on file   Intimate Partner Violence:     Fear of Current or Ex-Partner: Not on file    Emotionally Abused: Not on file    Physically Abused: Not on file    Sexually Abused: Not on file   Housing Stability:     Unable to Pay for Housing in the Last Year: Not on file    Number of [place] in the Last Year: Not on file    Unstable Housing in the Last Year: Not on file        Family History   Problem Relation Age of Onset    Osteoporosis Mother     Substance Abuse Father     Cirrhosis Father     Heart Attack Father     Other Sister         hep c       Vitals:    02/02/22 1349   BP: 98/60   Pulse: 64   Resp: 18   Temp: 98.6 °F (37 °C)   TempSrc: Temporal   SpO2: 97%   Weight: 151 lb (68.5 kg)   Height: 5' 8.5\" (1.74 m)     Estimated body mass index is 22.63 kg/m² as calculated from the following:    Height as of this encounter: 5' 8.5\" (1.74 m). Weight as of this encounter: 151 lb (68.5 kg).     Most Recent Labs  CBC  Lab Results   Component Value Date    WBC 9.1 02/07/2021    WBC 10.7 11/24/2018    WBC 11.9 10/28/2013    RBC 4.92 02/07/2021    RBC 5.62 11/24/2018    RBC 5.59 10/28/2013    HGB 13.6 02/07/2021    HGB 15.3 11/24/2018    HGB 15.4 10/28/2013    HCT 43.3 02/07/2021    HCT 47.5 11/24/2018    HCT 47.2 10/28/2013    MCV 88.0 02/07/2021    MCV 84.5 11/24/2018    MCV 84.4 10/28/2013     02/07/2021     11/24/2018     10/28/2013      CMP  Lab Results   Component Value Date     02/07/2021     11/24/2018     10/28/2013    K 4.1 02/07/2021    K 4.2 02/07/2021    K 3.9 11/24/2018     02/07/2021     11/24/2018     10/28/2013    CO2 26 02/07/2021    CO2 25 11/24/2018    CO2 30 10/28/2013    ANIONGAP 9 02/07/2021    ANIONGAP 14 11/24/2018    GLUCOSE 99 02/07/2021    GLUCOSE 72 02/06/2021    GLUCOSE 91 11/24/2018    GLUCOSE 80 04/24/2011    BUN 10 02/07/2021    BUN 16 11/24/2018    BUN 10 10/28/2013    CREATININE 0.8 02/07/2021    CREATININE 1.0 11/24/2018    CREATININE 0.9 10/28/2013    LABGLOM >60 02/07/2021    LABGLOM >60 11/24/2018    LABGLOM >60 10/28/2013    LABGLOM >60 04/24/2011    GFRAA >60 02/07/2021    GFRAA >60 11/24/2018    CALCIUM 8.7 02/07/2021    CALCIUM 9.6 11/24/2018    CALCIUM 9.4 10/28/2013    PROT 7.3 02/07/2021    PROT 7.9 11/24/2018    PROT 8.2 10/28/2013    LABALBU 4.0 02/07/2021    LABALBU 4.4 11/24/2018    LABALBU 4.6 10/28/2013    LABALBU 3.1 04/24/2011    BILITOT 0.4 02/07/2021    BILITOT 0.2 11/24/2018    BILITOT 0.5 10/28/2013    ALKPHOS 84 02/07/2021    ALKPHOS 76 11/24/2018    ALKPHOS 74 10/28/2013    AST 44 02/07/2021    AST 7 11/24/2018    AST 11 10/28/2013    ALT 66 02/07/2021    ALT 6 11/24/2018    ALT 6 10/28/2013     A1C  Lab Results   Component Value Date    LABA1C 5.4 11/24/2018     TSH  Lab Results   Component Value Date    TSH 0.580 10/28/2013     FREET4  Lab Results   Component Value Date    C0RWOZP 5.5 10/28/2013     LIPID  Lab Results   Component Value Date    CHOL 166 11/24/2018    HDL 47 11/24/2018    LDLCALC 92 11/24/2018    TRIG 135 11/24/2018     UA  Lab Results   Component Value Date    COLORU Yellow 02/06/2020    COLORU YELLOW 10/28/2013    CLARITYU Clear 02/06/2020    CLARITYU TURBID 10/28/2013    GLUCOSEU Negative 02/06/2020    GLUCOSEU NEGATIVE 10/28/2013    BILIRUBINUR Negative 02/06/2020    BILIRUBINUR NEGATIVE 10/28/2013    KETUA Negative 02/06/2020    KETUA NEGATIVE 10/28/2013    SPECGRAV 1.025 02/06/2020    SPECGRAV 1.025 10/28/2013    BLOODU Negative 02/06/2020    BLOODU LARGE 10/28/2013    PHUR 7.0 02/06/2020    PHUR 6.0 10/28/2013    PROTEINU Negative 02/06/2020    PROTEINU NEGATIVE 10/28/2013    UROBILINOGEN 0.2 02/06/2020    UROBILINOGEN 0.2 10/28/2013    NITRU POSITIVE 02/06/2020    NITRU POSITIVE 10/28/2013    LEUKOCYTESUR Negative 02/06/2020    LEUKOCYTESUR SMALL 10/28/2013       Physical Exam  Vitals and nursing note reviewed. Constitutional:       Appearance: Normal appearance. HENT:      Head: Normocephalic and atraumatic. Right Ear: External ear normal.      Left Ear: External ear normal.   Eyes:      Extraocular Movements: Extraocular movements intact. Conjunctiva/sclera: Conjunctivae normal.      Pupils: Pupils are equal, round, and reactive to light. Cardiovascular:      Rate and Rhythm: Normal rate and regular rhythm. Pulses: Normal pulses. Heart sounds: Normal heart sounds. Pulmonary:      Effort: Pulmonary effort is normal.      Breath sounds: Normal breath sounds. Abdominal:      General: Bowel sounds are normal.      Palpations: Abdomen is soft. Musculoskeletal:         General: Normal range of motion. Cervical back: Normal range of motion and neck supple. Skin:     General: Skin is warm and dry. Capillary Refill: Capillary refill takes less than 2 seconds. Neurological:      General: No focal deficit present. Mental Status: She is alert and oriented to person, place, and time. Psychiatric:         Mood and Affect: Mood normal.         Behavior: Behavior normal.         Thought Content: Thought content normal.         ASSESSMENT/PLAN:  Lizandro Irwin was seen today for referral - general.    Diagnoses and all orders for this visit:    Hepatitis C virus infection without hepatic coma, unspecified chronicity  -     AFL - Nataliya Mora DO, Gastroenterology, [facility]    Chronic migraine without aura without status migrainosus, not intractable  -     topiramate (TOPAMAX) 25 MG tablet; Take 1 tablet by mouth nightly  -     SUMAtriptan (IMITREX) 50 MG tablet;  Take 1 tablet by mouth once as needed for Migraine    Vaccination not carried out because of patient refusal    Flu vaccine need  -     INFLUENZA, MDCK QUADV, 2 YRS AND OLDER, IM, PF, PREFILL SYR OR SDV, 0.5ML (FLUCELVAX QUADV, PF)       Patient declined tetanus and pneumonia vaccinations   Patient wants to get the hepatitis C cleared first prior to a pap smear     As above. Call or go to the nearest ED immediately if symptoms worsen or persist.  Educational materials and/or home exercises printed for patient's review and were included in patient instructions on his/her After Visit Summary and given to patient at the end of visit. Counseled regarding above diagnosis, including possible risks and complications,  especially if left uncontrolled. Counseled regarding the possible side effects, risks, benefits and alternatives to treatment; patient and/or guardian verbalizes understanding, agrees, feels comfortable with and wishes to proceed with above treatment plan. Advised patient to call with any new medication issues, and read all Rx info from pharmacy to assure aware of all possible risks and side effects of medication before taking. Reviewed age and gender appropriate health screening exams and vaccinations. Advised patient regarding importance of keeping up with recommended health maintenance and to schedule as soon as possible if overdue, as this is important in assessing for undiagnosed pathology, especially cancer, as well as protecting against potentially harmful/life threatening disease. Patient and/or guardian verbalizes understanding and agrees with above counseling, assessment and plan. All questions answered. Return if symptoms worsen or fail to improve. An  electronic signature was used to authenticate this note.     --Schuyler Hobbs,  on 2/2/2022 at 2:47 PM

## 2022-02-14 ENCOUNTER — TELEPHONE (OUTPATIENT)
Dept: CARDIOLOGY | Age: 37
End: 2022-02-14

## 2022-02-14 NOTE — TELEPHONE ENCOUNTER
3rd and final attempt to reschedule pt for regular stress test. Left message for pt to call back to reschedule.

## 2022-02-14 NOTE — TELEPHONE ENCOUNTER
Spoke to the patient she wants to schedule and will call this afternoon when she gets home.  I will follow up later in the week

## 2022-08-12 ENCOUNTER — HOSPITAL ENCOUNTER (OUTPATIENT)
Age: 37
Discharge: HOME | End: 2022-08-12
Payer: MEDICARE

## 2022-08-12 ENCOUNTER — HOSPITAL ENCOUNTER (OUTPATIENT)
Dept: ULTRASOUND IMAGING | Age: 37
Discharge: HOME | End: 2022-08-14
Payer: MEDICARE

## 2022-08-12 DIAGNOSIS — B18.2 CHRONIC HEPATITIS C WITH HEPATIC COMA (HCC): ICD-10-CM

## 2022-08-12 PROCEDURE — 76981 USE PARENCHYMA: CPT

## 2022-08-12 PROCEDURE — 76705 ECHO EXAM OF ABDOMEN: CPT

## 2022-11-14 ENCOUNTER — TELEPHONE (OUTPATIENT)
Dept: FAMILY MEDICINE CLINIC | Age: 37
End: 2022-11-14

## 2022-11-14 DIAGNOSIS — R39.9 UTI SYMPTOMS: Primary | ICD-10-CM

## 2022-11-14 RX ORDER — SULFAMETHOXAZOLE AND TRIMETHOPRIM 800; 160 MG/1; MG/1
1 TABLET ORAL 2 TIMES DAILY
Qty: 6 TABLET | Refills: 0 | Status: SHIPPED | OUTPATIENT
Start: 2022-11-14 | End: 2022-11-17

## 2022-11-14 NOTE — TELEPHONE ENCOUNTER
Called and spoke to patient she is having urgency but unable to empty bladder, dysuria. No discharge. Has had symptoms a little over a week. [facility].

## 2022-11-14 NOTE — TELEPHONE ENCOUNTER
----- Message from Zander Samaniego sent at 11/14/2022  2:00 PM EST -----  Subject: Message to Provider    QUESTIONS  Information for Provider? Pt is requesting antibiotics for UTI.   ---------------------------------------------------------------------------  --------------  CALL BACK INFO  [phone]; OK to leave message on voicemail  ---------------------------------------------------------------------------  --------------  SCRIPT ANSWERS  Relationship to Patient?  Self

## 2023-03-17 ENCOUNTER — TELEPHONE (OUTPATIENT)
Dept: FAMILY MEDICINE CLINIC | Age: 38
End: 2023-03-17

## 2023-03-17 NOTE — TELEPHONE ENCOUNTER
Yes patient needs to be seen in the ER. That is an unusual BP to get but she needs to be checked out. Thank you.

## 2023-03-17 NOTE — TELEPHONE ENCOUNTER
Pt called and stated that she didn't feel good, her bp was 160/190(dad checked it this morning) pt stated her nose is gushing blood and her head felt weird.  She was advised to go to the e.r.  please advise

## 2023-10-16 ENCOUNTER — HOSPITAL ENCOUNTER (EMERGENCY)
Age: 38
Discharge: LEFT BEFORE DISCHARGE | End: 2023-10-16
Payer: OTHER MISCELLANEOUS

## 2023-10-16 VITALS
TEMPERATURE: 97.7 F | OXYGEN SATURATION: 100 % | SYSTOLIC BLOOD PRESSURE: 108 MMHG | DIASTOLIC BLOOD PRESSURE: 71 MMHG | RESPIRATION RATE: 18 BRPM | WEIGHT: 152 LBS | BODY MASS INDEX: 22.51 KG/M2 | HEIGHT: 69 IN | HEART RATE: 67 BPM
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
## 2023-10-16 DIAGNOSIS — Z53.29 LEFT AGAINST MEDICAL ADVICE: ICD-10-CM

## 2023-10-16 DIAGNOSIS — M79.631 PAIN OF RIGHT FOREARM: ICD-10-CM

## 2023-10-16 DIAGNOSIS — M25.561 ACUTE PAIN OF RIGHT KNEE: ICD-10-CM

## 2023-10-16 DIAGNOSIS — S16.1XXA STRAIN OF NECK MUSCLE, INITIAL ENCOUNTER: ICD-10-CM

## 2023-10-16 PROCEDURE — 99283 EMERGENCY DEPT VISIT LOW MDM: CPT

## 2023-10-16 RX ORDER — CYCLOBENZAPRINE HCL 10 MG
10 TABLET ORAL ONCE
Status: DISCONTINUED | OUTPATIENT
Start: 2023-10-16 | End: 2023-10-16 | Stop reason: HOSPADM

## 2023-10-16 RX ORDER — IBUPROFEN 400 MG/1
400 TABLET ORAL ONCE
Status: DISCONTINUED | OUTPATIENT
Start: 2023-10-16 | End: 2023-10-16 | Stop reason: HOSPADM

## 2023-10-16 ASSESSMENT — LIFESTYLE VARIABLES
HOW OFTEN DO YOU HAVE A DRINK CONTAINING ALCOHOL: NEVER
HOW MANY STANDARD DRINKS CONTAINING ALCOHOL DO YOU HAVE ON A TYPICAL DAY: PATIENT DOES NOT DRINK

## 2023-10-16 NOTE — DISCHARGE INSTRUCTIONS
Elevate  Ice 15 to 20 min every 3-4 hours  Use otc meds such as ibuprofen tylenol   Followup with primary care doctor this week  If needed or having no improvement, followup with orthopedics

## 2023-10-18 ENCOUNTER — TELEPHONE (OUTPATIENT)
Dept: FAMILY MEDICINE CLINIC | Age: 38
End: 2023-10-18
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
## 2023-10-19 ENCOUNTER — APPOINTMENT (OUTPATIENT)
Dept: GENERAL RADIOLOGY | Age: 38
End: 2023-10-19
Payer: OTHER MISCELLANEOUS

## 2023-10-19 ENCOUNTER — APPOINTMENT (OUTPATIENT)
Dept: CT IMAGING | Age: 38
End: 2023-10-19
Payer: OTHER MISCELLANEOUS

## 2023-10-19 ENCOUNTER — HOSPITAL ENCOUNTER (EMERGENCY)
Age: 38
Discharge: LEFT BEFORE DISCHARGE | End: 2023-10-19
Payer: OTHER MISCELLANEOUS

## 2023-10-19 VITALS
DIASTOLIC BLOOD PRESSURE: 67 MMHG | TEMPERATURE: 98.3 F | RESPIRATION RATE: 16 BRPM | WEIGHT: 152 LBS | SYSTOLIC BLOOD PRESSURE: 120 MMHG | BODY MASS INDEX: 22.45 KG/M2 | HEART RATE: 64 BPM

## 2023-10-19 DIAGNOSIS — R51.9 ACUTE NONINTRACTABLE HEADACHE, UNSPECIFIED HEADACHE TYPE: ICD-10-CM

## 2023-10-19 DIAGNOSIS — S50.11XA CONTUSION OF RIGHT FOREARM, INITIAL ENCOUNTER: ICD-10-CM
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
## 2023-10-19 DIAGNOSIS — M25.561 ACUTE PAIN OF RIGHT KNEE: ICD-10-CM

## 2023-10-19 PROCEDURE — 72125 CT NECK SPINE W/O DYE: CPT

## 2023-10-19 PROCEDURE — 70450 CT HEAD/BRAIN W/O DYE: CPT

## 2023-10-19 PROCEDURE — 73560 X-RAY EXAM OF KNEE 1 OR 2: CPT

## 2023-10-19 PROCEDURE — 73090 X-RAY EXAM OF FOREARM: CPT

## 2023-10-19 PROCEDURE — 99284 EMERGENCY DEPT VISIT MOD MDM: CPT

## 2023-10-19 ASSESSMENT — PAIN DESCRIPTION - PAIN TYPE: TYPE: ACUTE PAIN

## 2023-10-19 ASSESSMENT — PAIN DESCRIPTION - DESCRIPTORS: DESCRIPTORS: SORE;ACHING

## 2023-10-19 ASSESSMENT — PAIN DESCRIPTION - LOCATION: LOCATION: HEAD

## 2023-10-19 ASSESSMENT — PAIN - FUNCTIONAL ASSESSMENT: PAIN_FUNCTIONAL_ASSESSMENT: 0-10

## 2023-10-19 ASSESSMENT — PAIN SCALES - GENERAL: PAINLEVEL_OUTOF10: 4

## 2023-10-19 NOTE — SIGNIFICANT EVENT
Radiology Procedure Waiver   Name: James Wyatt  : 1985  MRN: [id number]    Date:  10/19/23    Time: 12:38 PM EDT    Benefits of immediately proceeding with Radiology exam(s) without pre-testing outweigh the risks or are not indicated as specified below and therefore the following is/are being waived:    [x] Pregnancy test   [x] Patients LMP on-time and regular.   [] Patient had Tubal Ligation or has other Contraception Device. [] Patient  is Menopausal or Premenarcheal.    [] Patient had Full or Partial Hysterectomy. [] Protocol for Iodine allergy    [] MRI Questionnaire     [] BUN/Creatinine   [] Patient age w/no hx of renal dysfunction. [] Patient on Dialysis. [] Recent Normal Labs.   Electronically signed by TUNDE Nogueira CNP on 10/19/23 at 12:38 PM EDT

## 2023-10-19 NOTE — ED NOTES
Pt pacing in room and came out and asked what we were waiting on . Stated we were waiting on XRAY results. Pt asked if she needed to be here for xray results. This nurse stated that if the pt wanted complete treatment than she needed to wait for results and to speak with provider. Pt returned to room at this time. Per chart pt was seen on 10-16 but left AMA.  Was sent here today by PCP as they would not allow her to be seen at office until she was seen in ER      Grace Andrews RN  10/19/23 9895
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
## 2024-01-03 ENCOUNTER — TELEPHONE (OUTPATIENT)
Dept: FAMILY MEDICINE CLINIC | Age: 39
End: 2024-01-03

## 2024-01-03 NOTE — TELEPHONE ENCOUNTER
----- Message from Elier Clarke sent at 1/3/2024 12:16 PM EST -----  Subject: Appointment Request    Reason for Call: Established Patient Appointment needed: Routine Physical   Exam with PAP    QUESTIONS    Reason for appointment request? Available appointments did not meet   patient need     Additional Information for Provider? pt needs to get a appointment asap   shes stated she wants to get checked out. if you have any sooner   appointments or cancellations will you be able to give her a call   [phone]  ---------------------------------------------------------------------------  --------------  CALL BACK INFO  [phone]; OK to leave message on voicemail  ---------------------------------------------------------------------------  --------------  SCRIPT ANSWERS